# Patient Record
Sex: MALE | Race: WHITE | Employment: FULL TIME | URBAN - METROPOLITAN AREA
[De-identification: names, ages, dates, MRNs, and addresses within clinical notes are randomized per-mention and may not be internally consistent; named-entity substitution may affect disease eponyms.]

---

## 2017-10-26 ENCOUNTER — HOSPITAL ENCOUNTER (EMERGENCY)
Age: 32
Discharge: HOME | End: 2017-10-26
Attending: EMERGENCY MEDICINE | Admitting: EMERGENCY MEDICINE
Payer: MEDICARE

## 2017-10-26 VITALS
WEIGHT: 250 LBS | BODY MASS INDEX: 33.86 KG/M2 | HEIGHT: 72 IN | TEMPERATURE: 97.8 F | DIASTOLIC BLOOD PRESSURE: 105 MMHG | SYSTOLIC BLOOD PRESSURE: 165 MMHG | OXYGEN SATURATION: 100 % | HEART RATE: 78 BPM | RESPIRATION RATE: 16 BRPM

## 2017-10-26 DIAGNOSIS — R22.0 FACIAL SWELLING: ICD-10-CM

## 2017-10-26 DIAGNOSIS — L40.9 PSORIASIS: ICD-10-CM

## 2017-10-26 DIAGNOSIS — L23.89 ALLERGIC CONTACT DERMATITIS DUE TO OTHER AGENTS: Primary | ICD-10-CM

## 2017-10-26 PROCEDURE — A9270 NON-COVERED ITEM OR SERVICE: HCPCS | Performed by: EMERGENCY MEDICINE

## 2017-10-26 PROCEDURE — 74011636637 HC RX REV CODE- 636/637: Performed by: EMERGENCY MEDICINE

## 2017-10-26 PROCEDURE — 99283 EMERGENCY DEPT VISIT LOW MDM: CPT

## 2017-10-26 PROCEDURE — 74011250637 HC RX REV CODE- 250/637: Performed by: EMERGENCY MEDICINE

## 2017-10-26 RX ORDER — FAMOTIDINE 20 MG/1
20 TABLET, FILM COATED ORAL
Status: COMPLETED | OUTPATIENT
Start: 2017-10-26 | End: 2017-10-26

## 2017-10-26 RX ORDER — DIPHENHYDRAMINE HCL 25 MG
25 CAPSULE ORAL
Status: COMPLETED | OUTPATIENT
Start: 2017-10-26 | End: 2017-10-26

## 2017-10-26 RX ORDER — PREDNISONE 20 MG/1
60 TABLET ORAL DAILY
Qty: 15 TAB | Refills: 0 | Status: SHIPPED | OUTPATIENT
Start: 2017-10-26 | End: 2019-01-02 | Stop reason: ALTCHOICE

## 2017-10-26 RX ORDER — DIPHENHYDRAMINE HCL 25 MG
25 CAPSULE ORAL
Qty: 30 CAP | Refills: 0 | Status: SHIPPED | OUTPATIENT
Start: 2017-10-26

## 2017-10-26 RX ORDER — PREDNISONE 20 MG/1
60 TABLET ORAL
Status: COMPLETED | OUTPATIENT
Start: 2017-10-26 | End: 2017-10-26

## 2017-10-26 RX ORDER — FAMOTIDINE 20 MG/1
20 TABLET, FILM COATED ORAL
Qty: 6 TAB | Refills: 0 | Status: SHIPPED | OUTPATIENT
Start: 2017-10-26 | End: 2017-10-26

## 2017-10-26 RX ADMIN — PREDNISONE 60 MG: 20 TABLET ORAL at 22:42

## 2017-10-26 RX ADMIN — DIPHENHYDRAMINE HYDROCHLORIDE 25 MG: 25 CAPSULE ORAL at 22:42

## 2017-10-26 RX ADMIN — FAMOTIDINE 20 MG: 20 TABLET, FILM COATED ORAL at 22:42

## 2017-10-27 NOTE — ED PROVIDER NOTES
[facility] [address]  EMERGENCY DEPARTMENT HISTORY AND PHYSICAL EXAM       Date of Service: 10/26/2017   Patient Name: Rogelio Parker. YOB: 1985  Medical Record Number: [id number]    History of Presenting Illness     Chief Complaint   Patient presents with    Facial Swelling     Mostly to orbitals x 1 week. Pt has treated at home with Benadryl. Pt denies difficulty breathing, speaking or swallowing. History Provided By:  patient    Additional History:   Rogelio Gutierrez is a 27 y.o. male with PMhx significant for psoriasis and DM who presents ambulatory with his wife to the ED with cc of facial redness, swelling, and itchiness which began 1 week ago and has progressively worsened. Pt states that the area of irritation began under his left eye and spread to his forehead and right eye. Pt attempted to treat his symptoms with Benadryl and Ibuprofen with some relief. His swelling reduced but the itchiness remained. He stopped taking the medication a few days ago. Pt reports that he woke up this morning with his left eye \"looking like a black eye\" that progressively worsened throughout the day. Pt notes that he works in [employer] and does not use gloves. He does not see a dermatologist or allergist. He denies recent new medication, hair product use, new food intake, seafood intake, or other known causes of his symptoms. Pt specifically denies throat swelling or throat tightness. Social Hx: + (1ppd) Tobacco, - EtOH, - Illicit Drugs    There are no other complaints, changes or physical findings at this time.     Primary Care Provider: None     Past History     Past Medical History:   Past Medical History:   Diagnosis Date    Contact dermatitis and other eczema, due to unspecified cause     psoriasis    Diabetes ([facility] [address]     Ill-defined condition     psoriasis    Joint pain     Other ill-defined conditions(799.89)     psoriasis      Past Surgical History: History reviewed. No pertinent surgical history. Family History:   Family History   Problem Relation Age of Onset    Cancer Paternal Grandmother      breast cancer    Stroke Paternal Grandmother       Social History:   Social History   Substance Use Topics    Smoking status: Current Every Day Smoker     Packs/day: 1.00    Smokeless tobacco: Never Used    Alcohol use No      Allergies: Allergies   Allergen Reactions    Latex Rash and Swelling       Review of Systems   Review of Systems   Constitutional: Negative for chills and fever. HENT: Negative. Negative for congestion, rhinorrhea, sneezing and sore throat. - throat swelling  - throat tightness   Eyes: Negative. Negative for redness and visual disturbance. Respiratory: Negative. Negative for cough, shortness of breath and wheezing. Cardiovascular: Negative. Negative for chest pain and leg swelling. Gastrointestinal: Negative. Negative for abdominal pain, diarrhea, nausea and vomiting. Genitourinary: Negative. Negative for difficulty urinating, discharge and frequency. Musculoskeletal: Negative. Negative for arthralgias, back pain, myalgias and neck stiffness. Skin: Positive for rash (on face, itchy, red). Negative for color change. + swelling of face   Neurological: Negative. Negative for dizziness, syncope, weakness, numbness and headaches. Hematological: Negative for adenopathy. Psychiatric/Behavioral: Negative. All other systems reviewed and are negative. Physical Exam  Physical Exam   Constitutional: He is oriented to person, place, and time. HENT:   Head: Normocephalic and atraumatic. Diffuse facial swelling and bilateral periorbital edema, left greater than right, diffuse erythema over eyes and forehead  No excoriation or weeping  No glottal or sub glottal swelling   Eyes: EOM are normal.   Cardiovascular: Normal rate, regular rhythm, normal heart sounds and intact distal pulses.   Exam reveals no gallop and no friction rub. No murmur heard. Pulmonary/Chest: Effort normal and breath sounds normal. No accessory muscle usage. No respiratory distress. He has no wheezes. He has no rales. He exhibits no tenderness. No increased work of breathing  Speaking in full sentences   Abdominal: Soft. Bowel sounds are normal. He exhibits no distension and no mass. There is no tenderness. There is no rebound and no guarding. Musculoskeletal: Normal range of motion. He exhibits no edema or tenderness. Neurological: He is alert and oriented to person, place, and time. Skin:   Diffuse plaques (consistent with psoriasis) on upper extremities    Psychiatric: He has a normal mood and affect. Nursing note and vitals reviewed. Medical Decision Making   I am the first provider for this patient. I reviewed the vital signs, available nursing notes, past medical history, past surgical history, family history and social history. Old Medical Records: Nursing notes. Provider Notes: Allergic reaction versus contact dermatitis, most likely due to chemical exposure from [address] work, though cause currently unknown. Will treat symptomatically and refer to dermatologist for further evaluation. ED Course:  10:10 PM   Initial assessment performed. The patients presenting problems have been discussed, and they are in agreement with the care plan formulated and outlined with them. I have encouraged them to ask questions as they arise throughout their visit. Diagnostic Study Results     Vital Signs-Reviewed the patient's vital signs.    Patient Vitals for the past 12 hrs:   Temp Pulse Resp BP SpO2   10/26/17 2145 97.8 °F (36.6 °C) 78 16 (!) 165/105 100 %     Medications Given in the ED:  Medications   famotidine (PEPCID) tablet 20 mg (20 mg Oral Given 10/26/17 2242)   predniSONE (DELTASONE) tablet 60 mg (60 mg Oral Given 10/26/17 2242)   diphenhydrAMINE (BENADRYL) capsule 25 mg (25 mg Oral Given 10/26/17 8242)     Diagnosis   Clinical Impression:   1. Allergic contact dermatitis due to other agents    2. Facial swelling    3. Psoriasis       Plan:  1:   Follow-up Information     Follow up With Details Comments Contact Info    [facility] In 4 weeks  [address] [facility] EMERGENCY DEPT  As needed, If symptoms worsen [address] [place] [address]  [phone]        2:   Discharge Medication List as of 10/26/2017 10:27 PM      START taking these medications    Details   famotidine (PEPCID) 20 mg tablet Take 1 Tab by mouth now for 1 dose., Normal, Disp-6 Tab, R-0      predniSONE (DELTASONE) 20 mg tablet Take 3 Tabs by mouth daily. , Normal, Disp-15 Tab, R-0      diphenhydrAMINE (BENADRYL) 25 mg capsule Take 1 Cap by mouth every six (6) hours as needed., Normal, Disp-30 Cap, R-0           Return to ED if Worse    Disposition Note:  Discharge Note:  10:40 PM  The patient has been re-evaluated and is ready for discharge. Reviewed available results with patient. Counseled patient on diagnosis and care plan. Patient has expressed understanding, and all questions have been answered. Patient agrees with plan and agrees to follow up as recommended, or return to the ED if their symptoms worsen. Discharge instructions have been provided and explained to the patient, along with reasons to return to the ED.  _______________________________   Attestations:     Attestation: This note is prepared by Manuela Mcduffie, acting as Scribe for MD Nader Cardozo MD: The scribe's documentation has been prepared under my direction and personally reviewed by me in its entirety.  I confirm that the note above accurately reflects all work, treatment, procedures, and medical decision making performed by me.  _______________________________

## 2017-10-27 NOTE — ED TRIAGE NOTES
Woke up about a week ago and his eyes itched. Took benadryl without any effect. Now they are swollen.

## 2017-10-27 NOTE — ED NOTES
Dr. Samson Molina reviewed discharge instructions with the patient and spouse. The patient and spouse verbalized understanding.

## 2017-10-27 NOTE — DISCHARGE INSTRUCTIONS
Dermatitis: Care Instructions  Your Care Instructions  Dermatitis is the general name used for any rash or inflammation of the skin. Different kinds of dermatitis cause different kinds of rashes. Common causes of a rash include new medicines, plants (such as poison oak or poison ivy), heat, and stress. Certain illnesses can also cause a rash. An allergic reaction to something that touches your skin, such as latex, nickel, or poison ivy, is called contact dermatitis. Contact dermatitis may also be caused by something that irritates the skin, such as bleach, a chemical, or soap. These types of rashes cannot be spread from person to person. How long your rash will last depends on what caused it. Rashes may last a few days or months. Follow-up care is a key part of your treatment and safety. Be sure to make and go to all appointments, and call your doctor if you are having problems. It's also a good idea to know your test results and keep a list of the medicines you take. How can you care for yourself at home? · Do not scratch the rash. Cut your nails short, and file them smooth. Or wear gloves if this helps keep you from scratching. · Wash the area with water only. Pat dry. · Put cold, wet cloths on the rash to reduce itching. · Keep cool, and stay out of the sun. · Leave the rash open to the air as much as possible. · If the rash itches, use hydrocortisone cream. Follow the directions on the label. Calamine lotion may help for plant rashes. · Take an over-the-counter antihistamine, such as diphenhydramine (Benadryl) or loratadine (Claritin), to help calm the itching. Read and follow all instructions on the label. · If your doctor prescribed a cream, use it as directed. If your doctor prescribed medicine, take it exactly as directed. When should you call for help?   Call your doctor now or seek immediate medical care if:  ? · You have symptoms of infection, such as:  ¨ Increased pain, swelling, warmth, or redness. ¨ Red streaks leading from the area. ¨ Pus draining from the area. ¨ A fever. ? · You have joint pain along with the rash. ? Watch closely for changes in your health, and be sure to contact your doctor if:  ? · Your rash is changing or getting worse. ? · You are not getting better as expected. Where can you learn more? Go to http://nae-barbara.info/. Enter [id number] in the search box to learn more about \"Dermatitis: Care Instructions. \"  Current as of: October 13, 2016  Content Version: 11.4  © 0827-3061 [employer]. Care instructions adapted under license by [employer] (which disclaims liability or warranty for this information). If you have questions about a medical condition or this instruction, always ask your healthcare professional. [address] any warranty or liability for your use of this information. Psoriasis: Care Instructions  Your Care Instructions  Psoriasis (say \"mai-EG-ht-juan carlos\") is a long-term skin problem that causes thick, white, silvery, or red patches on the skin. The patches may be small or large, and they occur most often on the knees, elbows, scalp, hands, feet, or lower back. The skin may be scaly. If the condition is severe, your skin can become itchy and tender. Psoriasis also can be embarrassing if the patches are on visible areas. You can treat psoriasis with good care at home and with medicine from your doctor. You may put medicine on your skin and take pills or have shots to stop the redness and swelling. Your doctor also may suggest ultraviolet light treatments. Follow-up care is a key part of your treatment and safety. Be sure to make and go to all appointments, and call your doctor if you are having problems. It's also a good idea to know your test results and keep a list of the medicines you take. How can you care for yourself at home?   · If your doctor prescribes medicine, use it exactly as prescribed. Follow your doctor's advice for sunlight or ultraviolet light treatment. Call your doctor if you think you are having a problem with your medicine. · Protect your skin:  ¨ Keep your skin moist. After bathing, put an ointment, cream, or lotion on your skin while it is still damp. This seals in moisture. Use over-the-counter products that your doctor suggests. These may include Cetaphil, Lubriderm, or Eucerin. Petroleum jelly (such as Vaseline) and vegetable shortening (such as Crisco) also work. ¨ If you have psoriasis on your scalp, use a shampoo with salicylic acid, such as Neutrogena T/Sal.  ¨ Avoid harsh skin products, such as those that contain alcohol. ¨ Cover your skin in cold weather. ¨ Try to prevent sunburn. Although short periods of sun exposure reduce psoriasis in most people, too much sun can damage the skin and cause skin cancer. In addition, sunburns can trigger psoriasis. Use sunscreen on areas of your skin that do not have psoriasis. Make sure to use a broad-spectrum sunscreen that has a sun protection factor (SPF) of 30 or higher. Use it every day, even when it is cloudy. ¨ Take care to avoid accidents such as cutting or scraping your skin. An injury to the skin can cause psoriasis patches to form anywhere on the body, including the area of the injury. ¨ Avoid tight shoes, clothing, watchbands, and hats. These may irritate your skin. ¨ Use a vaporizer or humidifier to add moisture to your bedroom. Follow the directions for cleaning the machine. · Try making one or more changes to your daily habits to help with managing your psoriasis. For example:  ¨ Try to control stress and anxiety. They may cause psoriasis to appear suddenly or can make symptoms worse. ¨ If you smoke, think about quitting. If you need help quitting, talk to your doctor about stop-smoking programs and medicines. ¨ If you drink, limit or reduce the amount of alcohol you drink.   ¨ If you are overweight, see if you can lose some weight. · Seek support from family and friends. Talk to a counselor or other professional if you feel sad about your condition and need more help. When should you call for help? Call your doctor now or seek immediate medical care if:  ? · You have signs of infection, such as:  ¨ Increased pain, swelling, warmth, or redness. ¨ Red streaks leading from the area. ¨ Pus draining from the area. ¨ A fever. ? Watch closely for changes in your health, and be sure to contact your doctor if:  ? · You have swelling, stiffness, or pain in your joints. ? · You do not get better as expected. Where can you learn more? Go to http://nae-barbara.info/. Enter Z693 in the search box to learn more about \"Psoriasis: Care Instructions. \"  Current as of: October 13, 2016  Content Version: 11.4  © 9822-9853 [employer]. Care instructions adapted under license by [employer] (which disclaims liability or warranty for this information). If you have questions about a medical condition or this instruction, always ask your healthcare professional. [place] [address] any warranty or liability for your use of this information.

## 2019-01-02 ENCOUNTER — OFFICE VISIT (OUTPATIENT)
Dept: PRIMARY CARE CLINIC | Age: 34
End: 2019-01-02

## 2019-01-02 VITALS
DIASTOLIC BLOOD PRESSURE: 86 MMHG | TEMPERATURE: 98.3 F | RESPIRATION RATE: 18 BRPM | WEIGHT: 260.6 LBS | OXYGEN SATURATION: 96 % | HEIGHT: 74 IN | BODY MASS INDEX: 33.45 KG/M2 | SYSTOLIC BLOOD PRESSURE: 138 MMHG | HEART RATE: 89 BPM

## 2019-01-02 DIAGNOSIS — L40.9 PSORIASIS: ICD-10-CM

## 2019-01-02 DIAGNOSIS — H61.21 IMPACTED CERUMEN OF RIGHT EAR: ICD-10-CM

## 2019-01-02 DIAGNOSIS — H92.01 RIGHT EAR PAIN: ICD-10-CM

## 2019-01-02 DIAGNOSIS — L40.50 ARTHRITIS WITH PSORIASIS (HCC): ICD-10-CM

## 2019-01-02 DIAGNOSIS — F31.9 BIPOLAR DISORDER WITH DEPRESSION (HCC): ICD-10-CM

## 2019-01-02 DIAGNOSIS — Z72.0 TOBACCO ABUSE: ICD-10-CM

## 2019-01-02 DIAGNOSIS — E11.9 CONTROLLED TYPE 2 DIABETES MELLITUS WITHOUT COMPLICATION, WITHOUT LONG-TERM CURRENT USE OF INSULIN (HCC): Primary | ICD-10-CM

## 2019-01-02 DIAGNOSIS — D49.2 ABNORMAL SKIN GROWTH: ICD-10-CM

## 2019-01-02 DIAGNOSIS — E66.9 OBESITY (BMI 30.0-34.9): ICD-10-CM

## 2019-01-02 NOTE — PROGRESS NOTES
Chief Complaint   Patient presents with   Bebeto.Butt Establish Care    Ear Pain     right      Visit Vitals  BP (!) 155/91 (BP 1 Location: Right arm, BP Patient Position: Sitting)   Pulse 89   Temp 98.3 °F (36.8 °C) (Oral)   Resp 18   Ht 6' 1.83\" (1.875 m)   Wt 260 lb 9.6 oz (118.2 kg)   SpO2 96%   BMI 33.61 kg/m²     1. Have you been to the ER, urgent care clinic since your last visit? Hospitalized since your last visit? No    2. Have you seen or consulted any other health care providers outside of the [address] [place] [address] since your last visit? Include any pap smears or colon screening.  No

## 2019-01-02 NOTE — PROGRESS NOTES
Written by Jeanna Merlin, as dictated by Dr. Deni Stanton MD.    Solitario Carlos. is a 26 y.o. male. HPI  The patient comes in today to establish care. He notes that it has been a while since he was last seen by a physician. His last PCP wanted him to check his BS at home . Patient was never prescribed medication for diabetes. Sometimes he does not have time to eat lunch during the day as he is busy, but if he does eat lunch it is a sandwich and soda. He denies weight gain and weight loss. He is not fasting for labs. In 2012 he went to a mental health clinic and he was admitted to the psychiatric [facility] for about 1 week for depression. He was diagnosed with bipolar disorder and depression, and was prescribed medication. The patient does not recall the name of the medication, but know it was not Zoloft. He took Zoloft when he was younger, but it caused aggression. Patient has not taken lithium, but notes that Lexapro sounds familiar. In 07/2018 he got a new job, and notes that going to work helps to keep him busy and feel better. However, there are some days when he still feels down. Patient notes that he is not a social person and he is ready to go home after visiting family for about 1 hour. His girlfriend has noticed that he is sometimes in \"his own little bubble\" and he snaps if she \"pops it. \" The patient has not realized that the snaps in those situations. He does not feel that he needs medication at this time. He uses OTC Eucerin for psoriasis, but notes he did not apply it today because it clears up his rash. Patient has been experiencing knee and hand pain, which he believes is due to psoriatic arthritis. The patient is only able to hold objects for a certain amount of time as it starts to cause pain. Patient has been experiencing R ear pain x about 1 month. He woke up one morning with the pain and lost hearing in his R ear.  Patient tried to clean his ear with q-tips without success. He has used OTC ear cleaning solution, peroxide, and ETOH without success. The patient stuck his finger in his ear and could feel something. Some days he cannot hear anything and other days sounds are muffled. The patient notes that he sometimes has migraines. He notes that some days his migraines present in the middle of the day. Taking OTC Excedrin and and laying down provides relief. The patient has a mole on his face, which he has had since he was a child. Patient notes that the mole was much smaller when he was a child but has grown in size lately and would like to have it examined. He has not had the mole examined in the past as he did not have insurance. BP is high today at 155/91, 138/86 on repeat. He has not been checking his BP at home. Patient smokes 2 pack of cigarettes daily. He notes that some days he only smokes 1 pack of cigarettes. The patient notes that smoking helps with his anxiety. He does not drink ETOH or use recreational drugs. 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 Patient Active Problem List   Diagnosis Code    DM (diabetes mellitus) ([facility] E11.9    Psoriasis L40.9    [facility], [facility] ([facility] F31.9        Current Outpatient Medications on File Prior to Visit   Medication Sig Dispense Refill    diphenhydrAMINE (BENADRYL) 25 mg capsule Take 1 Cap by mouth every six (6) hours as needed. 30 Cap 0     No current facility-administered medications on file prior to visit.         Allergies   Allergen Reactions    Latex Rash and Swelling       Past Medical History:   Diagnosis Date    Bipolar 1 disorder ([facility]     Contact dermatitis and other eczema, due to unspecified cause     psoriasis    Diabetes ([facility]     Ill-defined condition     psoriasis    Joint pain     Other ill-defined conditions(799.89)     psoriasis    Psoriasis        Family History   Problem Relation Age of Onset    Seizures Mother     Diabetes Father     Cancer Paternal Grandmother         breast cancer    Stroke Paternal Grandmother        Social History     Socioeconomic History    Marital status: UNKNOWN     Spouse name: Not on file    Number of children: Not on file    Years of education: Not on file    Highest education level: Not on file   Social Needs    Financial resource strain: Not on file    Food insecurity - worry: Not on file    Food insecurity - inability: Not on file   [employer] needs - medical: Not on file   [employer] needs - non-medical: Not on file   Occupational History    Not on file   Tobacco Use    Smoking status: Current Every Day Smoker     Packs/day: 2.00    Smokeless tobacco: Never Used   Substance and Sexual Activity    Alcohol use: No     Comment: occasional    Drug use: Not on file    Sexual activity: Yes     Partners: Female   Other Topics Concern    Not on file   Social History Narrative    Not on file       Review of Systems   Constitutional: Negative for malaise/fatigue and weight loss. HENT: Positive for ear pain (R). Negative for congestion. Eyes: Negative for blurred vision and pain. Respiratory: Negative for cough and shortness of breath. Cardiovascular: Negative for chest pain and palpitations. Gastrointestinal: Negative for abdominal pain and heartburn. Genitourinary: Negative for frequency and urgency. Musculoskeletal: Positive for joint pain (BL knee and hand). Negative for myalgias. Skin: Positive for rash. +mole on face   Neurological: Positive for headaches. Negative for dizziness, tingling, sensory change and weakness. Psychiatric/Behavioral: Positive for depression and substance abuse (tobacco). Negative for memory loss. The patient is nervous/anxious.       Visit Vitals  BP (!) 155/91 (BP 1 Location: Right arm, BP Patient Position: Sitting)   Pulse 89   Temp 98.3 °F (36.8 °C) (Oral)   Resp 18   Ht 6' 1.83\" (1.875 m)   Wt 260 lb 9.6 oz (118.2 kg)   SpO2 96%   BMI 33.61 kg/m²       Physical Exam   Constitutional: He is oriented to person, place, and time. He appears well-developed. No distress. Obese   HENT:   Right Ear: External ear normal.   Left Ear: External ear normal.   R cerumen impaction   Eyes: Conjunctivae and EOM are normal. Right eye exhibits no discharge. Left eye exhibits no discharge. Neck: Normal range of motion. Neck supple. Cardiovascular: Normal rate and regular rhythm. Pulmonary/Chest: Effort normal and breath sounds normal. He has no wheezes. Abdominal: Soft. Bowel sounds are normal. There is no tenderness. Lymphadenopathy:     He has no cervical adenopathy. Neurological: He is alert and oriented to person, place, and time. Skin: He is not diaphoretic. No erythema. Large skin growth on L temple  + scaly rash on both forearms & dorsum of the hand   Psychiatric: He has a normal mood and affect. His behavior is normal.   Nursing note and vitals reviewed. ASSESSMENT and PLAN    ICD-10-CM ICD-9-CM    1. Controlled type 2 diabetes mellitus without complication, without long-term current use of insulin (HCC) E11.9 250.00 CBC WITH AUTOMATED DIFF      METABOLIC PANEL, COMPREHENSIVE      HEMOGLOBIN A1C WITH EAG    CBC, CMP, and HbA1c ordered. I will contact him with his lab results. 2. Psoriasis L40.9 696.1 Patient uses OTC Eucerin with relief. Referred to dermatology. 3. Arthritis with psoriasis ([facility] L40.50 696.0 No treatment given at this time. 4. Bipolar disorder with depression ([facility] F31.30 296.50 No medication given at this time. He should let me know if he starts feeling more depression and I will prescribe medication. 5. Obesity (BMI 30.0-34. 9) E66.9 278.00 Encouraged him to maintain a healthy diet and exercise. 6. Right ear pain H92.01 388.70 Ear wash performed in office.    7. Impacted cerumen of right ear H61.21 380.4 REMOVE IMPACTED EAR WAX    Time out: Immediately prior to procedure a \"time out\" was called to verify the correct patient, procedure, equipment, support staff and site/side marked as required. Ear wash attempted in office. Patient could not tolerate the ear wash. He should try OTC treatment to clean his ear, but he should not use q-tips. If patient is unable to dislodge the cerumen, I will refer him to ENT. 8. Tobacco abuse Z72.0 305.1 Urged him to quit smoking. He should slowly decrease from 2 packs per days to 1.5 or 1 pack daily. 9. Abnormal skin growth D49.2 239.2 REFERRAL TO DERMATOLOGY    Referred to dermatology. Patient's next appointment should be a complete physical exam and he should fast for labs. This plan was reviewed with the patient and patient agrees. All questions were answered. This scribe documentation was reviewed by me and accurately reflects the examination and decisions made by me. This note will not be viewable in [address]

## 2019-01-03 LAB
ALBUMIN SERPL-MCNC: 4.3 G/DL (ref 3.5–5.5)
ALBUMIN/GLOB SERPL: 1.6 {RATIO} (ref 1.2–2.2)
ALP SERPL-CCNC: 80 IU/L (ref 39–117)
ALT SERPL-CCNC: 60 IU/L (ref 0–44)
AST SERPL-CCNC: 32 IU/L (ref 0–40)
BASOPHILS # BLD AUTO: 0 X10E3/UL (ref 0–0.2)
BASOPHILS NFR BLD AUTO: 0 %
BILIRUB SERPL-MCNC: 0.5 MG/DL (ref 0–1.2)
BUN SERPL-MCNC: 9 MG/DL (ref 6–20)
BUN/CREAT SERPL: 9 (ref 9–20)
CALCIUM SERPL-MCNC: 9.4 MG/DL (ref 8.7–10.2)
CHLORIDE SERPL-SCNC: 104 MMOL/L (ref 96–106)
CO2 SERPL-SCNC: 22 MMOL/L (ref 20–29)
CREAT SERPL-MCNC: 0.96 MG/DL (ref 0.76–1.27)
EOSINOPHIL # BLD AUTO: 0.2 X10E3/UL (ref 0–0.4)
EOSINOPHIL NFR BLD AUTO: 2 %
ERYTHROCYTE [DISTWIDTH] IN BLOOD BY AUTOMATED COUNT: 13.4 % (ref 12.3–15.4)
EST. AVERAGE GLUCOSE BLD GHB EST-MCNC: 111 MG/DL
GLOBULIN SER CALC-MCNC: 2.7 G/DL (ref 1.5–4.5)
GLUCOSE SERPL-MCNC: 146 MG/DL (ref 65–99)
HBA1C MFR BLD: 5.5 % (ref 4.8–5.6)
HCT VFR BLD AUTO: 45.6 % (ref 37.5–51)
HGB BLD-MCNC: 15.7 G/DL (ref 13–17.7)
IMM GRANULOCYTES # BLD: 0 X10E3/UL (ref 0–0.1)
IMM GRANULOCYTES NFR BLD: 0 %
LYMPHOCYTES # BLD AUTO: 1.8 X10E3/UL (ref 0.7–3.1)
LYMPHOCYTES NFR BLD AUTO: 19 %
MCH RBC QN AUTO: 30.4 PG (ref 26.6–33)
MCHC RBC AUTO-ENTMCNC: 34.4 G/DL (ref 31.5–35.7)
MCV RBC AUTO: 88 FL (ref 79–97)
MONOCYTES # BLD AUTO: 0.6 X10E3/UL (ref 0.1–0.9)
MONOCYTES NFR BLD AUTO: 7 %
NEUTROPHILS # BLD AUTO: 6.6 X10E3/UL (ref 1.4–7)
NEUTROPHILS NFR BLD AUTO: 72 %
PLATELET # BLD AUTO: 183 X10E3/UL (ref 150–379)
POTASSIUM SERPL-SCNC: 3.8 MMOL/L (ref 3.5–5.2)
PROT SERPL-MCNC: 7 G/DL (ref 6–8.5)
RBC # BLD AUTO: 5.17 X10E6/UL (ref 4.14–5.8)
SODIUM SERPL-SCNC: 143 MMOL/L (ref 134–144)
WBC # BLD AUTO: 9.2 X10E3/UL (ref 3.4–10.8)

## 2019-01-07 NOTE — PROGRESS NOTES
Let him know diabetes number came back fine but liver enzymes are elevated. Ask him if his ear is feeling better otherwise he can come for ear wash.

## 2019-01-14 ENCOUNTER — TELEPHONE (OUTPATIENT)
Dept: PRIMARY CARE CLINIC | Age: 34
End: 2019-01-14

## 2019-01-14 NOTE — PROGRESS NOTES
Spoke to patient reviewed labs. He states he feels if he still has something in his ear. He has been using softening drops and will come for another ear wash this week.

## 2019-01-16 ENCOUNTER — CLINICAL SUPPORT (OUTPATIENT)
Dept: PRIMARY CARE CLINIC | Age: 34
End: 2019-01-16

## 2019-01-16 ENCOUNTER — OFFICE VISIT (OUTPATIENT)
Dept: PRIMARY CARE CLINIC | Age: 34
End: 2019-01-16

## 2019-01-16 VITALS
DIASTOLIC BLOOD PRESSURE: 94 MMHG | OXYGEN SATURATION: 97 % | SYSTOLIC BLOOD PRESSURE: 137 MMHG | HEART RATE: 84 BPM | HEIGHT: 73 IN | WEIGHT: 260 LBS | RESPIRATION RATE: 16 BRPM | TEMPERATURE: 98.4 F | BODY MASS INDEX: 34.46 KG/M2

## 2019-01-16 DIAGNOSIS — H92.01 RIGHT EAR PAIN: Primary | ICD-10-CM

## 2019-01-16 DIAGNOSIS — H61.21 IMPACTED CERUMEN OF RIGHT EAR: ICD-10-CM

## 2019-01-16 DIAGNOSIS — T16.1XXA FOREIGN BODY OF RIGHT EAR, INITIAL ENCOUNTER: Primary | ICD-10-CM

## 2019-01-16 DIAGNOSIS — H92.01 EAR PAIN, RIGHT: ICD-10-CM

## 2019-01-16 DIAGNOSIS — H91.91 DECREASED HEARING OF RIGHT EAR: ICD-10-CM

## 2019-01-16 NOTE — PROGRESS NOTES
This note will not be viewable in [address] Prateek Person is a  26 y.o. male presents for visit. Right ear pain and decreased hearing. Chief Complaint   Patient presents with    Ear Pain     patient came in for ear cleaning. states that right ear is having some pain and was using drops to soften and would like the ear cleaned. HPI  Patient presents for follow-up on right ear pain with decreased hearing/cerumen impaction. He was seen by Dr. Violeta Ngo a couple of weeks ago and an attempt to lavage his right ear was unsuccessful. Since that time he has been using Debrox and hydrogen peroxide drops without success. He is continuing to complain of decreased hearing with pain noted. Patient reports using ear buds at night. This is mainly in his right ear due to psoriasis scales in his left ear. No additional complaints today. Review of Systems   Constitutional: Negative for chills and fever. HENT: Positive for ear pain and hearing loss. Negative for ear discharge and sinus pain. Respiratory: Negative for cough. Cardiovascular: Negative for chest pain and palpitations. Visit Vitals  BP (!) 137/94 (BP 1 Location: Right arm, BP Patient Position: Sitting)   Pulse 84   Temp 98.4 °F (36.9 °C) (Oral)   Resp 16   Ht 6' 1\" (1.854 m)   Wt 260 lb (117.9 kg)   SpO2 97%   BMI 34.30 kg/m²     Physical Exam   Constitutional: No distress. HENT:   Head: Normocephalic and atraumatic. Right Ear: Tympanic membrane is not erythematous. No middle ear effusion. Left Ear: Tympanic membrane is not erythematous. No middle ear effusion. Nose: No mucosal edema or rhinorrhea. Right sinus exhibits no maxillary sinus tenderness and no frontal sinus tenderness. Left sinus exhibits no maxillary sinus tenderness and no frontal sinus tenderness. Mouth/Throat: Uvula is midline and mucous membranes are normal. No oropharyngeal exudate or posterior oropharyngeal erythema.    Foreign body noted to right ear canal after lavage. Appeared to be a black plastic With demarcated edges. Able to visualize his TM around the foreign body and appeared normal.   Eyes: Conjunctivae are normal.   Cardiovascular: Regular rhythm and normal heart sounds. No murmur heard. Pulmonary/Chest: Effort normal and breath sounds normal. He has no wheezes. He has no rales. Lymphadenopathy:     He has no cervical adenopathy. Skin:   Diffuse scaly plaques noted to skin. Nursing note and vitals reviewed. Time out: Immediately prior to procedure a \"time out\" was called to verify the correct patient, procedure, equipment, support staff and site/side marked as required. No results found for this or any previous visit (from the past 24 hour(s)). Patient Active Problem List    Diagnosis Date Noted    DM (diabetes mellitus) ([facility] 10/31/2011    Psoriasis 10/31/2011    Bipolar disease, chronic ([facility] 10/31/2011         ASSESSMENT AND PLAN:      ICD-10-CM ICD-9-CM   1. Foreign body of right ear, initial encounter T16. 1XXA [id number]     [id number]   2. Ear pain, right H92.01 388.70   3. Decreased hearing of right ear H91.91 389.9   4. Impacted cerumen of right ear H61.21 380.4     Orders Placed This Encounter    REFERRAL TO ENT-OTOLARYNGOLOGY     Referral Priority:   Routine     Referral Type:   Consultation     Referral Reason:   Specialty Services Required     Requested Specialty:   Otolaryngology     Number of Visits Requested:   1    MA REMOVAL IMPACTED CERUMEN IRRIGATION/LVG UNILAT     Diagnoses and all orders for this visit:    1. Foreign body of right ear, initial encounter  -     REFERRAL TO ENT-OTOLARYNGOLOGY    2. Ear pain, right  -     REFERRAL TO ENT-OTOLARYNGOLOGY    3. Decreased hearing of right ear  -     REFERRAL TO ENT-OTOLARYNGOLOGY    4.  Impacted cerumen of right ear  -     MA REMOVAL IMPACTED CERUMEN IRRIGATION/LVG UNILAT        Telephone call made to Dr. Tejal Espinosa office by Shazia Howard LPN to schedule appointment immediately. Patient agreed to drive over to the office on his own. Follow-up Disposition:  Return in about 2 weeks (around 1/30/2019), or if symptoms worsen or fail to improve, for FULL Phyisal Exam with Dr. Violeta Ngo. Dr. Violeta Ngo personally evaluated patient and agreed with plan to refer patient to ENT for further treatment. Disclaimer:  Advised him to call back or return to office if symptoms worsen/change/persist.  Discussed expected course/resolution/complications of diagnosis in detail with patient. Medication risks/benefits/alternatives discussed with patient. He was given an after visit summary which includes diagnoses, current medications, & vitals. Discussed patient instructions and advised to read to all patient instructions regarding care. He expressed understanding with the diagnosis and plan.

## 2019-01-16 NOTE — PROGRESS NOTES
Chief Complaint   Patient presents with    Ear Pain     patient came in for ear cleaning. states that right ear is having some pain and was using drops to soften and would like the ear cleaned.

## 2019-02-25 ENCOUNTER — OFFICE VISIT (OUTPATIENT)
Dept: PRIMARY CARE CLINIC | Age: 34
End: 2019-02-25

## 2019-02-25 VITALS
OXYGEN SATURATION: 99 % | HEART RATE: 90 BPM | SYSTOLIC BLOOD PRESSURE: 135 MMHG | DIASTOLIC BLOOD PRESSURE: 90 MMHG | RESPIRATION RATE: 18 BRPM | WEIGHT: 264.4 LBS | HEIGHT: 73 IN | BODY MASS INDEX: 35.04 KG/M2 | TEMPERATURE: 100.6 F

## 2019-02-25 DIAGNOSIS — R68.89 FLU-LIKE SYMPTOMS: ICD-10-CM

## 2019-02-25 DIAGNOSIS — R50.9 LOW GRADE FEVER: ICD-10-CM

## 2019-02-25 DIAGNOSIS — J02.9 SORE THROAT: ICD-10-CM

## 2019-02-25 DIAGNOSIS — J02.0 STREP PHARYNGITIS: Primary | ICD-10-CM

## 2019-02-25 LAB
FLUAV+FLUBV AG NOSE QL IA.RAPID: NEGATIVE POS/NEG
FLUAV+FLUBV AG NOSE QL IA.RAPID: NEGATIVE POS/NEG
S PYO AG THROAT QL: POSITIVE
VALID INTERNAL CONTROL?: YES
VALID INTERNAL CONTROL?: YES

## 2019-02-25 RX ORDER — AMOXICILLIN 875 MG/1
875 TABLET, FILM COATED ORAL 2 TIMES DAILY
Qty: 20 TAB | Refills: 0 | Status: SHIPPED | OUTPATIENT
Start: 2019-02-25 | End: 2019-03-07

## 2019-02-25 NOTE — PROGRESS NOTES
HPI:     Chief Complaint   Patient presents with    Generalized Body Aches    Flu Like Symptoms     body ache fever two days        Patient is a 26 y.o. male who presents for evaluation of flu-like symptoms and sore throat. Patient reports 2 day history of sore throat, nasal congestion, body aches, dry nonproductive cough, and fever. Patient reports temp of 100.9 earlier today. Denies rhinorrhea, headache, ear pain, dyspnea, wheezing, difficulty breathing, chest pain, abdominal pain, nausea, vomiting, diarrhea. Appetite is decreased. Reports many people at work are sick with similar symptoms. Has been taking nyquil and tylenol, which help somewhat. Patient did not get flu shot this year. Patient Active Problem List   Diagnosis Code    [facility] E11.9    Psoriasis L40.9    Bipolar disease, chronic ([facility] F31.9     Current Outpatient Medications   Medication Sig Dispense Refill    amoxicillin (AMOXIL) 875 mg tablet Take 1 Tab by mouth two (2) times a day for 10 days. 20 Tab 0    diphenhydrAMINE (BENADRYL) 25 mg capsule Take 1 Cap by mouth every six (6) hours as needed. 30 Cap 0     Allergies   Allergen Reactions    Latex Rash and Swelling     Past Medical History:   Diagnosis Date    B[facility] disorder ([facility]     Contact dermatitis and other eczema, due to unspecified cause     psoriasis    Diabetes ([facility]     Ill-defined condition     psoriasis    Joint pain     Other ill-defined conditions(799.89)     psoriasis    Psoriasis           ROS:   Pertinent items are noted in HPI. Objective:     Vitals:    02/25/19 1548   BP: 135/90   Pulse: 90   Resp: 18   Temp: (!) 100.6 °F (38.1 °C)   TempSrc: Oral   SpO2: 99%   Weight: 264 lb 6.4 oz (119.9 kg)   Height: 6' 1\" (1.854 m)        Vitals and Nurse Documentation reviewed.     Physical Examination:   General appearance - alert, well appearing, and in no distress  Mental status - alert, oriented to person, place, and time, normal mood, behavior, speech, dress, motor activity, and thought processes  Eyes - pupils equal and reactive, sclera white, conjunctiva pink  Ears - bilateral TM's and external ear canals normal  Nose - mucosal congestion, mucosal erythema, purulent rhinorrhea, sinuses nontender  Mouth - mucous membranes moist, tonsils and pharynx erythematous, no exudate noted  Neck - supple, no significant adenopathy  Chest - clear to auscultation, no wheezes, rales or rhonchi, symmetric air entry  Heart - normal rate, regular rhythm, normal S1, S2, no murmurs, rubs, clicks or gallops  Neurological - alert, oriented, normal speech, no focal findings or movement disorder noted  Extremities - peripheral pulses normal, no pedal edema, no clubbing or cyanosis      Assessment/ Plan:   Diagnoses and all orders for this visit:    1. Strep pharyngitis  -     Rapid strep is positive. -     Start amoxicillin (AMOXIL) 875 mg tablet; Take 1 Tab by mouth two (2) times a day for 10 days. Medication benefits, risks, indication, dosage, potential adverse effects, and alternate medication options were discussed with patient who expressed understanding. Discussed that antibiotic can cause N/V/D and to increase fluid intake and consider probiotic or yogurt with probiotics. -     Acetaminophen or ibuprofen as needed for fever, discomfort   -     Advised on supportive care, throat lozenges, salt water gargles, soft/cold foods, increased hydration, cool mist humidifier  -     Discard toothbrush after 24 hours of treatment    2. Sore throat  -     AMB POC RAPID STREP A is positive. 3. Flu-like symptoms  -     AMB POC PJ INFLUENZA A/B TEST is negative. 4. Low grade fever  -     AMB POC PJ INFLUENZA A/B TEST  -     AMB POC RAPID STREP A       Follow-up Disposition:  Return if symptoms worsen or fail to improve. I have discussed the diagnosis with the patient and the intended plan as seen in the above orders.   Advised prompt follow-up if symptoms worsen or fail to improve and symptoms that would warrant emergent evaluation in ED. The patient has received an after-visit summary and questions were answered concerning future plans. I have discussed medication side effects and warnings with the patient as well. Patient expressed understanding and is in agreement with the diagnosis and plan.

## 2019-02-25 NOTE — LETTER
NOTIFICATION RETURN TO WORK / SCHOOL 
 
2/25/2019 4:22 PM 
 
Mr. Jackson Valenzuela. [address] [address] To Whom It May Concern: 
 
Jackson Valenzuela. is currently under the care of Mary Matias. Patient was evaluated in the office today 2/25/19. Please excuse from work 2/26/19. If there are questions or concerns please have the patient contact our office.  
 
 
 
Sincerely, 
 
 
Fiona Richardson NP

## 2019-02-25 NOTE — PATIENT INSTRUCTIONS

## 2019-03-20 ENCOUNTER — OFFICE VISIT (OUTPATIENT)
Dept: DERMATOLOGY | Facility: AMBULATORY SURGERY CENTER | Age: 34
End: 2019-03-20

## 2019-03-20 ENCOUNTER — HOSPITAL ENCOUNTER (OUTPATIENT)
Dept: LAB | Age: 34
Discharge: HOME | End: 2019-03-20

## 2019-03-20 VITALS
HEIGHT: 73 IN | WEIGHT: 264 LBS | TEMPERATURE: 98.2 F | RESPIRATION RATE: 16 BRPM | BODY MASS INDEX: 34.99 KG/M2 | DIASTOLIC BLOOD PRESSURE: 78 MMHG | HEART RATE: 82 BPM | SYSTOLIC BLOOD PRESSURE: 160 MMHG | OXYGEN SATURATION: 98 %

## 2019-03-20 DIAGNOSIS — D48.5 NEOPLASM OF UNCERTAIN BEHAVIOR OF SKIN OF TEMPORAL REGION: Primary | ICD-10-CM

## 2019-03-20 DIAGNOSIS — L40.9 PSORIASIS: ICD-10-CM

## 2019-03-20 DIAGNOSIS — D22.4 NEVUS OF SCALP: ICD-10-CM

## 2019-03-20 PROCEDURE — 88305 TISSUE EXAM BY PATHOLOGIST: CPT

## 2019-03-20 NOTE — PROGRESS NOTES
Written by Virgil Easley, as dictated by Yogi Bryant, [address]. Name: Angelica Jarvis. Age: 26 y.o. Date: 3/20/2019    Chief Complaint:   Chief Complaint   Patient presents with    Skin Exam     Areas of concern: Left temple - mole       Subjective:    HPI:  Mr.. Angelica Jarvis. is a 26 y.o. male who presents for the evaluation of a lesion on the left temporal hairline. The patient was referred by Yulisa Garcia NP for this evaluation. He states that the lesion appeared in his early childhood. The patient has not had prior treatment for this lesion. Associated symptoms include growing lesion. He states the lesion has been present since he can remember, however it has grown rapidly in the past few years. Gets hit with haircuts and brushing. No other associated symptoms. He also notes a similar smaller lesion on the scalp. He reports having psoriasis since a teen.     ROS: Consitutional: Negative  Dermatological : positive for - skin lesion changes    Social History     Socioeconomic History    Marital status: UNKNOWN     Spouse name: Not on file    Number of children: Not on file    Years of education: Not on file    Highest education level: Not on file   Occupational History    Not on file   Social Needs    Financial resource strain: Not on file    Food insecurity:     Worry: Not on file     Inability: Not on file    Transportation needs:     Medical: Not on file     Non-medical: Not on file   Tobacco Use    Smoking status: Current Every Day Smoker     Packs/day: 2.00    Smokeless tobacco: Never Used   Substance and Sexual Activity    Alcohol use: No     Comment: occasional    Drug use: Not on file    Sexual activity: Yes     Partners: Female   Lifestyle    Physical activity:     Days per week: Not on file     Minutes per session: Not on file    Stress: Not on file   Relationships    Social connections:     Talks on phone: Not on file     Gets together: Not on file     Attends [employer] service: Not on file     Active member of club or organization: Not on file     Attends meetings of clubs or organizations: Not on file     Relationship status: Not on file    Intimate partner violence:     Fear of current or ex partner: Not on file     Emotionally abused: Not on file     Physically abused: Not on file     Forced sexual activity: Not on file   Other Topics Concern    Not on file   Social History Narrative    Not on file       Family History   Problem Relation Age of Onset    Seizures Mother     Diabetes Father     Cancer Paternal Grandmother         breast cancer    Stroke Paternal Grandmother        Past Medical History:   Diagnosis Date    [facility] ([facility]     Contact dermatitis and other eczema, due to unspecified cause     psoriasis    Diabetes ([facility]     Ill-defined condition     psoriasis    Joint pain     Other ill-defined conditions(799.89)     psoriasis    Psoriasis     Sun-damaged skin     Tanning bed exposure        History reviewed. No pertinent surgical history. Current Outpatient Medications   Medication Sig Dispense Refill    ibuprofen 100 mg tablet Take 100 mg by mouth every six (6) hours as needed for Pain.  aspirin-acetaminophen-caffeine (EXCEDRIN ES) 250-250-65 mg per tablet Take 1 Tab by mouth.  diphenhydrAMINE (BENADRYL) 25 mg capsule Take 1 Cap by mouth every six (6) hours as needed. 30 Cap 0       Allergies   Allergen Reactions    Latex Rash and Swelling         Objective:    Visit Vitals  /78 (BP 1 Location: Right arm, BP Patient Position: Sitting)   Pulse 82   Temp 98.2 °F (36.8 °C)   Resp 16   Ht 6' 1\" (1.854 m)   Wt 264 lb (119.7 kg)   SpO2 98%   BMI 34.83 kg/m²       Lazarus Lightning. is a 26 y.o. male who appears well and in no distress. He is awake, alert, and oriented. A limited skin examination was completed including the left temporal hairline.     There is a 23 x 14 mm violaceous pedunculated papule on the left temporal hairline, most consistent with hemangioma. There is a flesh colored papule on the scalp consistent with scalp nevus,no concerning features. Left temporal hairline    Assessment/Plan:    1. Neoplasm of Uncertain Behavior, left temporal hairline, favor hemangioma. The differential diagnoses were discussed. A shave removal was advised to address this lesion. The procedure was reviewed and verbal and written consent were obtained. The risks of pain, bleeding, infection, recurrence and scar were discussed. I performed the procedure. The site was cleansed and anesthetized with 1% Lidocaine with Epinephrine 1:100,000. A shave removal was performed to remove the lesion in its clinical entirety. Drysol was used for hemostasis. The wound was bandaged and care reviewed. The specimen was sent to pathology. I will contact the patient with the results and any further treatment that may be necessary. 2. Normal nevus of scalp. The diagnosis of normal nevi was reviewed. I discussed sun protection, sunscreen use, the warning signs of skin cancer, the need for self-skin examinations, and the need for regular practitioner exams. The patient should follow up sooner as needed if new, changing, or symptomatic skin lesions arise. 3. H/o Psoriasis. This plan was reviewed with the patient and patient agrees. All questions were answered. This scribe documentation was reviewed by me and accurately reflects the examination and decisions made by me. [facility]   OFFICE PROCEDURE PROGRESS NOTE   Chart reviewed for the following:   [place], have reviewed the History, Physical and updated the Allergic reactions for Janelle Johnston. Frutoso Goldabby TIME OUT performed immediately prior to start of procedure:   [place], [address], have performed the following reviews on Janelle Johnston.   prior to the start of the procedure: * Patient was identified by name and date of birth   * Agreement on procedure being performed was verified   * Risks and Benefits explained to the patient   * Procedure site verified and marked as necessary   * Patient was positioned for comfort   * Consent was signed and verified     Time: 9:45 AM  Date of procedure: 3/20/2019  Procedure performed by: Henrietta Dawson.  Brianna Corea  Provider assisted by: Doris Romero LPN   Patient assisted by: self   How tolerated by patient: tolerated the procedure well with no complications   Comments: none

## 2019-03-20 NOTE — PATIENT INSTRUCTIONS
Chief Complaint   Patient presents with    Skin Exam     Areas of concern: Left temple - mole     Self Skin Exam and Sunscreens    Early detection and treatment is essential in the treatment of all forms of skin cancer. If caught early, all forms of skin cancer are curable. In addition to your regular visits, you should perform a monthly skin examination. Over time, you become familiar with what is normally found on your skin and can identify new or suspicious spots. One of the screening tools you can use to assess your skin is to follow the ABCDEs:    A= Asymmetry (One half is unlike the other half)     B= Border (An irregular, scalloped or poorly defined edge)    C= Color (Is varied from one area to another, has shades of tan, brown/ black, white, red or blue)    D= Diameter (Spots larger than 6mm or a pencil eraser)    E= Evolving (New spots or one that is changing in size, shape, or color)    A follow- up interval will be customized based on your history of skin cancer or level of skin damage and risk factors. In any case, if you notice a suspicious or new spot, an appointment should be arranged between regular visits. Everyone should use sunscreen and sun-safe practices, which is especially important for those with a personal or family history of skin cancer. Suggestions for this include:    1. Use daily moisturizers containing SPF 30 or higher. 2. Wear long sleeve clothing with UPF ratings and a broad-brimmed hat. 3. Apply sunscreen with SPF 30 or higher to all sun exposed areas if you are going to be in the sun. A broad spectrum UVA/ UVB sunscreen is best.  Dont forget to REAPPLY every two hours or more often if swimming or sweating! 4. Avoid outside activities during peak sun hours, especially in the summer (10am- 2pm). 5. DO NOT use tanning beds.     Using sunscreen and sun-safe practices can help reduce the likelihood of developing skin cancer or additional skin cancers in those previously diagnosed.

## 2022-07-22 ENCOUNTER — APPOINTMENT (OUTPATIENT)
Dept: CT IMAGING | Age: 37
End: 2022-07-22
Attending: FAMILY MEDICINE
Payer: MEDICAID

## 2022-07-22 ENCOUNTER — HOSPITAL ENCOUNTER (EMERGENCY)
Age: 37
Discharge: HOME | End: 2022-07-22
Attending: FAMILY MEDICINE
Payer: MEDICAID

## 2022-07-22 VITALS
DIASTOLIC BLOOD PRESSURE: 117 MMHG | TEMPERATURE: 98.4 F | WEIGHT: 264 LBS | SYSTOLIC BLOOD PRESSURE: 162 MMHG | OXYGEN SATURATION: 97 % | HEART RATE: 83 BPM | BODY MASS INDEX: 35.76 KG/M2 | HEIGHT: 72 IN | RESPIRATION RATE: 14 BRPM

## 2022-07-22 DIAGNOSIS — M54.42 CHRONIC BILATERAL LOW BACK PAIN WITH BILATERAL SCIATICA: Primary | ICD-10-CM

## 2022-07-22 DIAGNOSIS — G89.29 CHRONIC BILATERAL LOW BACK PAIN WITH BILATERAL SCIATICA: Primary | ICD-10-CM

## 2022-07-22 DIAGNOSIS — M54.41 CHRONIC BILATERAL LOW BACK PAIN WITH BILATERAL SCIATICA: Primary | ICD-10-CM

## 2022-07-22 PROCEDURE — 72131 CT LUMBAR SPINE W/O DYE: CPT

## 2022-07-22 PROCEDURE — 74011250636 HC RX REV CODE- 250/636: Performed by: FAMILY MEDICINE

## 2022-07-22 PROCEDURE — 96372 THER/PROPH/DIAG INJ SC/IM: CPT

## 2022-07-22 PROCEDURE — 99284 EMERGENCY DEPT VISIT MOD MDM: CPT

## 2022-07-22 RX ORDER — DEXTROMETHORPHAN HYDROBROMIDE, GUAIFENESIN 5; 100 MG/5ML; MG/5ML
650 LIQUID ORAL
Qty: 15 TABLET | Refills: 0 | Status: SHIPPED | OUTPATIENT
Start: 2022-07-22 | End: 2022-07-27

## 2022-07-22 RX ORDER — KETOROLAC TROMETHAMINE 30 MG/ML
30 INJECTION, SOLUTION INTRAMUSCULAR; INTRAVENOUS
Status: COMPLETED | OUTPATIENT
Start: 2022-07-22 | End: 2022-07-22

## 2022-07-22 RX ORDER — NAPROXEN 500 MG/1
500 TABLET ORAL
Qty: 14 TABLET | Refills: 0 | Status: SHIPPED | OUTPATIENT
Start: 2022-07-22 | End: 2022-07-29

## 2022-07-22 RX ORDER — CYCLOBENZAPRINE HCL 10 MG
10 TABLET ORAL
Qty: 15 TABLET | Refills: 0 | Status: SHIPPED | OUTPATIENT
Start: 2022-07-22 | End: 2022-07-27

## 2022-07-22 RX ADMIN — KETOROLAC TROMETHAMINE 30 MG: 30 INJECTION, SOLUTION INTRAMUSCULAR; INTRAVENOUS at 20:29

## 2022-07-22 NOTE — Clinical Note
[address] [facility] EMERGENCY DEPARTMENT  [address] [facility] [address]  [phone]    Work/School Note    Date: 7/22/2022    To Whom It May concern:    Chris Davila. was seen and treated today in the emergency room by the following provider(s):  Attending Provider: Ksenia Gutierrez DO. Chris Davila. is excused from work/school on 7/22/2022 through 7/24/2022. He is medically clear to return to work/school on 7/25/2022.          Sincerely,          Rhiannon Javier DO

## 2022-07-23 NOTE — ED TRIAGE NOTES
Pt has back pain with no trauma. Has a long history of back pain. States tingling and pain when walking.

## 2022-07-24 NOTE — ED PROVIDER NOTES
EMERGENCY DEPARTMENT HISTORY AND PHYSICAL EXAM      Date: 7/22/2022  Patient Name: Antony Acosta. History of Presenting Illness     Chief Complaint   Patient presents with    Back Pain       History Provided By:     HPI: Antony Acosta., is a very pleasant 43 y.o. male presenting to the ED with a chief complaint of lower back pain. Patient states his symptoms started over 10 years ago. Pain is achy on either side of his back. He occasional sharp pains in the back of his leg. No numbness tingling or weakness in extremities. No saddle anesthesia. No difficulty urinating or stooling. No fevers. The patient denies any other symptoms at this time. PCP: Shahid Holguin MD    No current facility-administered medications on file prior to encounter. Current Outpatient Medications on File Prior to Encounter   Medication Sig Dispense Refill    ibuprofen 100 mg tablet Take 100 mg by mouth every six (6) hours as needed for Pain. aspirin-acetaminophen-caffeine (EXCEDRIN ES) 250-250-65 mg per tablet Take 1 Tab by mouth. diphenhydrAMINE (BENADRYL) 25 mg capsule Take 1 Cap by mouth every six (6) hours as needed. 30 Cap 0       Past History     Past Medical History:  Past Medical History:   Diagnosis Date    Bipolar 1 disorder ([address]     Contact dermatitis and other eczema, due to unspecified cause     psoriasis    Diabetes ([address]     Ill-defined condition     psoriasis    Joint pain     Other ill-defined conditions(799.89)     psoriasis    Psoriasis     Sun-damaged skin     Tanning bed exposure        Past Surgical History:  History reviewed. No pertinent surgical history.     Family History:  Family History   Problem Relation Age of Onset    Seizures Mother     Diabetes Father     Cancer Paternal Grandmother         breast cancer    Stroke Paternal Grandmother        Social History:  Social History     Tobacco Use    Smoking status: Every Day     Packs/day: 1.50     Types: Cigarettes    Smokeless tobacco: Never   Vaping Use    Vaping Use: Never used   Substance Use Topics    Alcohol use: No     Comment: occasional    Drug use: Never       Allergies: Allergies   Allergen Reactions    Latex Rash and Swelling         Review of Systems     Review of Systems   Constitutional:  Negative for activity change, appetite change, chills, fatigue and fever. HENT:  Negative for congestion and sore throat. Eyes:  Negative for photophobia and visual disturbance. Respiratory:  Negative for cough, shortness of breath and wheezing. Cardiovascular:  Negative for chest pain, palpitations and leg swelling. Gastrointestinal:  Negative for abdominal pain, diarrhea, nausea and vomiting. Endocrine: Negative for cold intolerance and heat intolerance. Musculoskeletal:  Positive for back pain. Negative for gait problem and joint swelling. Skin:  Negative for color change and rash. Neurological:  Negative for dizziness and headaches. Physical Exam     Physical Exam  Constitutional:       General: He is not in acute distress. Appearance: Normal appearance. He is not toxic-appearing. HENT:      Head: Normocephalic and atraumatic. Right Ear: External ear normal.      Left Ear: External ear normal.      Mouth/Throat:      Mouth: Mucous membranes are moist.      Pharynx: Oropharynx is clear. Eyes:      Extraocular Movements: Extraocular movements intact. Conjunctiva/sclera: Conjunctivae normal.   Cardiovascular:      Rate and Rhythm: Normal rate and regular rhythm. Pulses: Normal pulses. Heart sounds: Normal heart sounds. Pulmonary:      Effort: Pulmonary effort is normal. No respiratory distress. Breath sounds: Normal breath sounds. No wheezing, rhonchi or rales. Abdominal:      General: There is no distension. Palpations: Abdomen is soft. Tenderness: There is no abdominal tenderness. There is no guarding or rebound. Musculoskeletal:         General: No deformity. Cervical back: Normal range of motion and neck supple. Right lower leg: No edema. Left lower leg: No edema. Comments: Lumbar paraspinal tenderness. Mild tenderness over L3-L4. No motor nor sensory deficits lower extremities. Skin:     Capillary Refill: Capillary refill takes less than 2 seconds. Findings: No erythema or rash. Neurological:      General: No focal deficit present. Mental Status: He is alert and oriented to person, place, and time. Gait: Gait normal.   Psychiatric:         Mood and Affect: Mood normal.         Behavior: Behavior normal.       Lab and Diagnostic Study Results     Labs -   No results found for this or any previous visit (from the past 12 hour(s)). Radiologic Studies -   @lastxrresult@  CT Results  (Last 48 hours)                 07/22/22 2109  CT SPINE LUMB WO CONT Final result    Impression:  Unremarkable lumbar spine           Narrative:  EXAM:  CT LUMBAR SPINE WITHOUT  CONTRAST       INDICATION: Chronic lower back pain. COMPARISON: Abdomen pelvis CT 4/15/2014. CONTRAST:  None. TECHNIQUE: Multislice helical CT of the lumbar spine was performed without   intravenous contrast administration. Coronal and sagittal reformats were   generated. CT dose reduction was achieved through use of a standardized   protocol tailored for this examination and automatic exposure control for dose   modulation. FINDINGS:       The alignment is within normal limits. There is no fracture or compression   deformity. The paravertebral soft tissues are within normal limits. Lower thoracic spine: No herniation or stenosis. L1-L2: There is no spinal canal or neural foraminal stenosis. L2-L3: There is no spinal canal or neural foraminal stenosis. L3-L4: There is no spinal canal or neural foraminal stenosis. L4-L5: There is no spinal canal or neural foraminal stenosis.        L5-S1: There is no spinal canal or neural foraminal stenosis. CXR Results  (Last 48 hours)      None              Medical Decision Making   - I am the first provider for this patient. - I reviewed the vital signs, available nursing notes, past medical history, past surgical history, family history and social history. - Initial assessment performed. The patients presenting problems have been discussed, and they are in agreement with the care plan formulated and outlined with them. I have encouraged them to ask questions as they arise throughout their visit. Vital Signs-Reviewed the patient's vital signs. No data found. ED Course/ Provider Notes (Medical Decision Making):     Patient presented to the emergency department with the aforementioned chief complaint. On examination the patient is nontoxic. Vitals were reviewed per above. No findings consistent with infectious process nor cauda equina. CT lumbar spine without acute abnormality. Patient feeling better after Toradol. Discussed supportive measures for his likely lumbar paraspinal muscle/sciatica strain. However information to follow-up with orthopedic spine  surgery given chronicity of symptoms. Jw Cortez was given a thorough list of signs and symptoms that would warrant an immediate return to the emergency department. Otherwise Jw Cortez will follow up with PCP. Procedures   Medical Decision Makingedical Decision Making  Performed by: Lyman Seip, DO  Procedures  None       Disposition   Disposition:     Home     All of the diagnostic tests were reviewed and questions answered. Diagnosis, care plan and treatment options were discussed. The patient understands the instructions and will follow up as directed. The patients results have been reviewed with them. They have been counseled regarding their diagnosis.   The patient verbally convey understanding and agreement of the signs, symptoms, diagnosis, treatment and prognosis and additionally agrees to follow up as recommended with their PCP in 24 - 48 hours. They also agree with the care-plan and convey that all of their questions have been answered. I have also put together some discharge instructions for them that include: 1) educational information regarding their diagnosis, 2) how to care for their diagnosis at home, as well a 3) list of reasons why they would want to return to the ED prior to their follow-up appointment, should their condition change. DISCHARGE PLAN:    1. Cannot display discharge medications since this patient is not currently admitted. 2.   Follow-up Information       Follow up With Specialties Details Why Contact Info    Your primary care doctor  Schedule an appointment as soon as possible for a visit in 2 days      Morse Fabry, MD Orthopedic Surgery Call   [address] [facility]  [phone]                3.  Return to ED if worse       4. Discharge Medication List as of 7/22/2022  9:22 PM        START taking these medications    Details   acetaminophen (Tylenol 8 Hour) 650 mg TbER Take 1 Tablet by mouth every eight (8) hours as needed for Pain for up to 5 days. , Normal, Disp-15 Tablet, R-0      cyclobenzaprine (FLEXERIL) 10 mg tablet Take 1 Tablet by mouth three (3) times daily as needed for Muscle Spasm(s) for up to 5 days. , Normal, Disp-15 Tablet, R-0      naproxen (NAPROSYN) 500 mg tablet Take 1 Tablet by mouth two (2) times daily as needed for Pain for up to 7 days. , Normal, Disp-14 Tablet, R-0           CONTINUE these medications which have NOT CHANGED    Details   ibuprofen 100 mg tablet Take 100 mg by mouth every six (6) hours as needed for Pain., Historical Med      aspirin-acetaminophen-caffeine (EXCEDRIN ES) 250-250-65 mg per tablet Take 1 Tab by mouth., Historical Med      diphenhydrAMINE (BENADRYL) 25 mg capsule Take 1 Cap by mouth every six (6) hours as needed., Normal, Disp-30 Cap, R-0 Diagnosis     Clinical Impression:    1. Chronic bilateral low back pain with bilateral sciatica        Attestations:    Anna Douglass, DO    Please note that this dictation was completed with [email or web address], the computer voice recognition software. Quite often unanticipated grammatical, syntax, homophones, and other interpretive errors are inadvertently transcribed by the computer software. Please disregard these errors. Please excuse any errors that have escaped final proofreading. Thank you.

## 2023-07-27 ENCOUNTER — OFFICE VISIT (OUTPATIENT)
Facility: CLINIC | Age: 38
End: 2023-07-27
Payer: MEDICAID

## 2023-07-27 VITALS
TEMPERATURE: 98.4 F | RESPIRATION RATE: 17 BRPM | HEIGHT: 73 IN | HEART RATE: 94 BPM | DIASTOLIC BLOOD PRESSURE: 82 MMHG | WEIGHT: 243.6 LBS | BODY MASS INDEX: 32.29 KG/M2 | SYSTOLIC BLOOD PRESSURE: 141 MMHG | OXYGEN SATURATION: 96 %

## 2023-07-27 DIAGNOSIS — L40.9 PSORIASIS: ICD-10-CM

## 2023-07-27 DIAGNOSIS — Z11.4 ENCOUNTER FOR SCREENING FOR HIV: ICD-10-CM

## 2023-07-27 DIAGNOSIS — Z13.6 ENCOUNTER FOR LIPID SCREENING FOR CARDIOVASCULAR DISEASE: ICD-10-CM

## 2023-07-27 DIAGNOSIS — Z13.1 SCREENING FOR DIABETES MELLITUS (DM): ICD-10-CM

## 2023-07-27 DIAGNOSIS — Z13.220 ENCOUNTER FOR LIPID SCREENING FOR CARDIOVASCULAR DISEASE: ICD-10-CM

## 2023-07-27 DIAGNOSIS — Z00.00 ENCOUNTER FOR WELLNESS EXAMINATION IN ADULT: Primary | ICD-10-CM

## 2023-07-27 DIAGNOSIS — I10 PRIMARY HYPERTENSION: ICD-10-CM

## 2023-07-27 DIAGNOSIS — Z11.59 NEED FOR HEPATITIS C SCREENING TEST: ICD-10-CM

## 2023-07-27 DIAGNOSIS — F31.9 BIPOLAR DISEASE, CHRONIC (HCC): ICD-10-CM

## 2023-07-27 DIAGNOSIS — Z23 ENCOUNTER FOR IMMUNIZATION: ICD-10-CM

## 2023-07-27 PROCEDURE — 3077F SYST BP >= 140 MM HG: CPT

## 2023-07-27 PROCEDURE — 99204 OFFICE O/P NEW MOD 45 MIN: CPT

## 2023-07-27 PROCEDURE — 99385 PREV VISIT NEW AGE 18-39: CPT

## 2023-07-27 PROCEDURE — 3079F DIAST BP 80-89 MM HG: CPT

## 2023-07-27 RX ORDER — AMLODIPINE BESYLATE 10 MG/1
10 TABLET ORAL DAILY
Qty: 60 TABLET | Refills: 0 | Status: SHIPPED | OUTPATIENT
Start: 2023-07-27

## 2023-07-27 SDOH — ECONOMIC STABILITY: FOOD INSECURITY: WITHIN THE PAST 12 MONTHS, THE FOOD YOU BOUGHT JUST DIDN'T LAST AND YOU DIDN'T HAVE MONEY TO GET MORE.: NEVER TRUE

## 2023-07-27 SDOH — ECONOMIC STABILITY: HOUSING INSECURITY
IN THE LAST 12 MONTHS, WAS THERE A TIME WHEN YOU DID NOT HAVE A STEADY PLACE TO SLEEP OR SLEPT IN A SHELTER (INCLUDING NOW)?: NO

## 2023-07-27 SDOH — ECONOMIC STABILITY: FOOD INSECURITY: WITHIN THE PAST 12 MONTHS, YOU WORRIED THAT YOUR FOOD WOULD RUN OUT BEFORE YOU GOT MONEY TO BUY MORE.: NEVER TRUE

## 2023-07-27 SDOH — ECONOMIC STABILITY: INCOME INSECURITY: HOW HARD IS IT FOR YOU TO PAY FOR THE VERY BASICS LIKE FOOD, HOUSING, MEDICAL CARE, AND HEATING?: NOT VERY HARD

## 2023-07-27 ASSESSMENT — PATIENT HEALTH QUESTIONNAIRE - PHQ9
SUM OF ALL RESPONSES TO PHQ QUESTIONS 1-9: 12
5. POOR APPETITE OR OVEREATING: 0
9. THOUGHTS THAT YOU WOULD BE BETTER OFF DEAD, OR OF HURTING YOURSELF: 1
SUM OF ALL RESPONSES TO PHQ QUESTIONS 1-9: 12
4. FEELING TIRED OR HAVING LITTLE ENERGY: 3
6. FEELING BAD ABOUT YOURSELF - OR THAT YOU ARE A FAILURE OR HAVE LET YOURSELF OR YOUR FAMILY DOWN: 3
SUM OF ALL RESPONSES TO PHQ9 QUESTIONS 1 & 2: 1
SUM OF ALL RESPONSES TO PHQ QUESTIONS 1-9: 12
8. MOVING OR SPEAKING SO SLOWLY THAT OTHER PEOPLE COULD HAVE NOTICED. OR THE OPPOSITE, BEING SO FIGETY OR RESTLESS THAT YOU HAVE BEEN MOVING AROUND A LOT MORE THAN USUAL: 1
3. TROUBLE FALLING OR STAYING ASLEEP: 0
SUM OF ALL RESPONSES TO PHQ QUESTIONS 1-9: 11
2. FEELING DOWN, DEPRESSED OR HOPELESS: 1
1. LITTLE INTEREST OR PLEASURE IN DOING THINGS: 0
7. TROUBLE CONCENTRATING ON THINGS, SUCH AS READING THE NEWSPAPER OR WATCHING TELEVISION: 3
10. IF YOU CHECKED OFF ANY PROBLEMS, HOW DIFFICULT HAVE THESE PROBLEMS MADE IT FOR YOU TO DO YOUR WORK, TAKE CARE OF THINGS AT HOME, OR GET ALONG WITH OTHER PEOPLE: 2

## 2023-07-27 ASSESSMENT — COLUMBIA-SUICIDE SEVERITY RATING SCALE - C-SSRS
5. HAVE YOU STARTED TO WORK OUT OR WORKED OUT THE DETAILS OF HOW TO KILL YOURSELF? DO YOU INTEND TO CARRY OUT THIS PLAN?: NO
6. HAVE YOU EVER DONE ANYTHING, STARTED TO DO ANYTHING, OR PREPARED TO DO ANYTHING TO END YOUR LIFE?: NO
2. HAVE YOU ACTUALLY HAD ANY THOUGHTS OF KILLING YOURSELF?: YES
3. HAVE YOU BEEN THINKING ABOUT HOW YOU MIGHT KILL YOURSELF?: YES
1. WITHIN THE PAST MONTH, HAVE YOU WISHED YOU WERE DEAD OR WISHED YOU COULD GO TO SLEEP AND NOT WAKE UP?: YES
4. HAVE YOU HAD THESE THOUGHTS AND HAD SOME INTENTION OF ACTING ON THEM?: YES

## 2023-07-27 ASSESSMENT — ENCOUNTER SYMPTOMS
SORE THROAT: 0
RHINORRHEA: 0
DIARRHEA: 0
VOMITING: 0
WHEEZING: 0
ABDOMINAL PAIN: 0
PHOTOPHOBIA: 0
EYE PAIN: 0
BACK PAIN: 0
COUGH: 0
CHEST TIGHTNESS: 0
ALLERGIC/IMMUNOLOGIC NEGATIVE: 1
NAUSEA: 0
SHORTNESS OF BREATH: 0
ABDOMINAL DISTENTION: 0

## 2023-08-06 LAB
ALBUMIN SERPL-MCNC: 4.8 G/DL (ref 4.1–5.1)
ALBUMIN/GLOB SERPL: 1.6 {RATIO} (ref 1.2–2.2)
ALP SERPL-CCNC: 118 IU/L (ref 44–121)
ALT SERPL-CCNC: 53 IU/L (ref 0–44)
AST SERPL-CCNC: 34 IU/L (ref 0–40)
BILIRUB SERPL-MCNC: 0.9 MG/DL (ref 0–1.2)
BUN SERPL-MCNC: 10 MG/DL (ref 6–20)
BUN/CREAT SERPL: 11 (ref 9–20)
CALCIUM SERPL-MCNC: 9.7 MG/DL (ref 8.7–10.2)
CHLORIDE SERPL-SCNC: 103 MMOL/L (ref 96–106)
CHOLEST SERPL-MCNC: 156 MG/DL (ref 100–199)
CO2 SERPL-SCNC: 25 MMOL/L (ref 20–29)
CREAT SERPL-MCNC: 0.95 MG/DL (ref 0.76–1.27)
EGFRCR SERPLBLD CKD-EPI 2021: 104 ML/MIN/1.73
ERYTHROCYTE [DISTWIDTH] IN BLOOD BY AUTOMATED COUNT: 12.6 % (ref 11.6–15.4)
GLOBULIN SER CALC-MCNC: 3 G/DL (ref 1.5–4.5)
GLUCOSE SERPL-MCNC: 184 MG/DL (ref 70–99)
HCT VFR BLD AUTO: 48.1 % (ref 37.5–51)
HDLC SERPL-MCNC: 65 MG/DL
HGB BLD-MCNC: 16.7 G/DL (ref 13–17.7)
LDLC SERPL CALC-MCNC: 79 MG/DL (ref 0–99)
MCH RBC QN AUTO: 30.3 PG (ref 26.6–33)
MCHC RBC AUTO-ENTMCNC: 34.7 G/DL (ref 31.5–35.7)
MCV RBC AUTO: 87 FL (ref 79–97)
PLATELET # BLD AUTO: 176 X10E3/UL (ref 150–450)
POTASSIUM SERPL-SCNC: 4.3 MMOL/L (ref 3.5–5.2)
PROT SERPL-MCNC: 7.8 G/DL (ref 6–8.5)
RBC # BLD AUTO: 5.51 X10E6/UL (ref 4.14–5.8)
SODIUM SERPL-SCNC: 142 MMOL/L (ref 134–144)
TRIGL SERPL-MCNC: 58 MG/DL (ref 0–149)
VLDLC SERPL CALC-MCNC: 12 MG/DL (ref 5–40)
WBC # BLD AUTO: 9.4 X10E3/UL (ref 3.4–10.8)

## 2023-08-07 LAB
HBA1C MFR BLD: 8 % (ref 4.8–5.6)
HCV IGG SERPL QL IA: NON REACTIVE
HIV 1+2 AB+HIV1 P24 AG SERPL QL IA: NON REACTIVE

## 2023-08-08 ENCOUNTER — TELEPHONE (OUTPATIENT)
Facility: CLINIC | Age: 38
End: 2023-08-08

## 2023-08-08 DIAGNOSIS — E11.65 TYPE 2 DIABETES MELLITUS WITH HYPERGLYCEMIA, WITHOUT LONG-TERM CURRENT USE OF INSULIN (HCC): Primary | ICD-10-CM

## 2023-08-08 RX ORDER — METFORMIN HYDROCHLORIDE 500 MG/1
1000 TABLET, EXTENDED RELEASE ORAL
Qty: 180 TABLET | Refills: 0 | Status: SHIPPED | OUTPATIENT
Start: 2023-08-08

## 2023-08-08 NOTE — RESULT ENCOUNTER NOTE
Please contact patient regarding labs results and recommendations. Thank you. Dear Delano Brown.,    Thank you for completing your lab work. I have reviewed your lab results and interpretations are as folllows:    1. A1c 8.0%   Fasting sugars (glucose) greater than 125 or Hemoglobin A1C greater than 6.4% are criteria for diabetes. If not managed, this can lead to complications to include heart attacks, strokes, kidney disease, vision problems, and pain or loss of feeling in the hands and feet. I am recommending starting a medication called Metformin to take daily with food in an effort lower your A1c. Some side effects may include upset stomach, vomiting, or diarrhea. Weight loss along with a healthy diet and routine, almost daily exercise can help manage this diagnosis. Avoid sweets, limit starches (rice, potatoes) and recheck fasting blood sugar and/or A1C in 3 months. Will send medication to pharmacy.      -Liver & kidney function normal  -HIV & Hep C negative  -Blood count, cholesterol normal

## 2023-08-17 ENCOUNTER — NURSE ONLY (OUTPATIENT)
Facility: CLINIC | Age: 38
End: 2023-08-17

## 2023-08-17 VITALS — DIASTOLIC BLOOD PRESSURE: 78 MMHG | SYSTOLIC BLOOD PRESSURE: 136 MMHG

## 2023-08-17 DIAGNOSIS — I10 PRIMARY HYPERTENSION: Primary | ICD-10-CM

## 2023-08-17 NOTE — PROGRESS NOTES
1. \"Have you been to the ER, urgent care clinic since your last visit? Hospitalized since your last visit? \" none    2. \"Have you seen or consulted any other health care providers outside of the [address] [place] [address] since your last visit? \" none     3. For patients aged 43-73: Has the patient had a colonoscopy / FIT/ Cologuard? No      If the patient is female:    4. For patients aged 43-66: Has the patient had a mammogram within the past 2 years? none      5. For patients aged 21-65: Has the patient had a pap smear?  None    Chief Complaint   Patient presents with    Follow-up    Hypertension     Nurse visit only    /78 (Site: Left Upper Arm, Position: Sitting, Cuff Size: Large Adult)      Patient is here to have a B/P check only

## 2023-10-26 ENCOUNTER — OFFICE VISIT (OUTPATIENT)
Facility: CLINIC | Age: 38
End: 2023-10-26
Payer: COMMERCIAL

## 2023-10-26 VITALS
OXYGEN SATURATION: 97 % | HEART RATE: 94 BPM | HEIGHT: 73 IN | WEIGHT: 244 LBS | TEMPERATURE: 97.1 F | RESPIRATION RATE: 16 BRPM | DIASTOLIC BLOOD PRESSURE: 91 MMHG | BODY MASS INDEX: 32.34 KG/M2 | SYSTOLIC BLOOD PRESSURE: 129 MMHG

## 2023-10-26 DIAGNOSIS — E11.65 UNCONTROLLED TYPE 2 DIABETES MELLITUS WITH HYPERGLYCEMIA (HCC): ICD-10-CM

## 2023-10-26 DIAGNOSIS — I10 PRIMARY HYPERTENSION: ICD-10-CM

## 2023-10-26 DIAGNOSIS — I10 PRIMARY HYPERTENSION: Primary | ICD-10-CM

## 2023-10-26 PROCEDURE — 3052F HG A1C>EQUAL 8.0%<EQUAL 9.0%: CPT

## 2023-10-26 PROCEDURE — 3074F SYST BP LT 130 MM HG: CPT

## 2023-10-26 PROCEDURE — 99214 OFFICE O/P EST MOD 30 MIN: CPT

## 2023-10-26 PROCEDURE — 3080F DIAST BP >= 90 MM HG: CPT

## 2023-10-26 RX ORDER — LOSARTAN POTASSIUM 25 MG/1
25 TABLET ORAL DAILY
Qty: 90 TABLET | Refills: 0 | Status: SHIPPED | OUTPATIENT
Start: 2023-10-26

## 2023-10-26 RX ORDER — AMLODIPINE BESYLATE 10 MG/1
10 TABLET ORAL DAILY
Qty: 90 TABLET | Refills: 0 | Status: SHIPPED | OUTPATIENT
Start: 2023-10-26

## 2023-10-26 RX ORDER — METFORMIN HYDROCHLORIDE 500 MG/1
1000 TABLET, EXTENDED RELEASE ORAL
Qty: 180 TABLET | Refills: 0 | Status: SHIPPED | OUTPATIENT
Start: 2023-10-26

## 2023-10-26 ASSESSMENT — ENCOUNTER SYMPTOMS
DIARRHEA: 0
ALLERGIC/IMMUNOLOGIC NEGATIVE: 1
BACK PAIN: 0
ABDOMINAL DISTENTION: 0
WHEEZING: 0
VOMITING: 0
PHOTOPHOBIA: 0
COUGH: 0
EYE PAIN: 0
RHINORRHEA: 0
SHORTNESS OF BREATH: 0
NAUSEA: 0
SORE THROAT: 0
CHEST TIGHTNESS: 0
ABDOMINAL PAIN: 0

## 2023-12-15 ENCOUNTER — HOSPITAL ENCOUNTER (EMERGENCY)
Facility: HOSPITAL | Age: 38
Discharge: HOME | End: 2023-12-15
Attending: EMERGENCY MEDICINE
Payer: COMMERCIAL

## 2023-12-15 VITALS
TEMPERATURE: 97.8 F | RESPIRATION RATE: 18 BRPM | SYSTOLIC BLOOD PRESSURE: 155 MMHG | DIASTOLIC BLOOD PRESSURE: 107 MMHG | OXYGEN SATURATION: 99 % | HEART RATE: 84 BPM | WEIGHT: 240 LBS | HEIGHT: 72 IN | BODY MASS INDEX: 32.51 KG/M2

## 2023-12-15 DIAGNOSIS — J06.9 VIRAL URI WITH COUGH: Primary | ICD-10-CM

## 2023-12-15 DIAGNOSIS — I10 UNCONTROLLED HYPERTENSION: ICD-10-CM

## 2023-12-15 DIAGNOSIS — I10 PRIMARY HYPERTENSION: ICD-10-CM

## 2023-12-15 LAB
FLUAV AG NPH QL IA: NEGATIVE
FLUBV AG NOSE QL IA: NEGATIVE
SARS-COV-2 RNA RESP QL NAA+PROBE: NOT DETECTED
SOURCE: NORMAL

## 2023-12-15 PROCEDURE — 87635 SARS-COV-2 COVID-19 AMP PRB: CPT

## 2023-12-15 PROCEDURE — 6370000000 HC RX 637 (ALT 250 FOR IP): Performed by: EMERGENCY MEDICINE

## 2023-12-15 PROCEDURE — 99283 EMERGENCY DEPT VISIT LOW MDM: CPT

## 2023-12-15 PROCEDURE — 87804 INFLUENZA ASSAY W/OPTIC: CPT

## 2023-12-15 RX ORDER — AMLODIPINE BESYLATE 5 MG/1
10 TABLET ORAL
Status: COMPLETED | OUTPATIENT
Start: 2023-12-15 | End: 2023-12-15

## 2023-12-15 RX ORDER — AMLODIPINE BESYLATE 10 MG/1
10 TABLET ORAL DAILY
Qty: 30 TABLET | Refills: 0 | Status: SHIPPED | OUTPATIENT
Start: 2023-12-15 | End: 2024-01-14

## 2023-12-15 RX ORDER — BENZONATATE 100 MG/1
100 CAPSULE ORAL 3 TIMES DAILY PRN
Qty: 20 CAPSULE | Refills: 0 | Status: SHIPPED | OUTPATIENT
Start: 2023-12-15 | End: 2023-12-22

## 2023-12-15 RX ORDER — NAPROXEN 500 MG/1
500 TABLET ORAL 2 TIMES DAILY
Qty: 10 TABLET | Refills: 0 | Status: SHIPPED | OUTPATIENT
Start: 2023-12-15 | End: 2023-12-15 | Stop reason: SDUPTHER

## 2023-12-15 RX ORDER — BENZONATATE 100 MG/1
100 CAPSULE ORAL 3 TIMES DAILY PRN
Qty: 20 CAPSULE | Refills: 0 | Status: SHIPPED | OUTPATIENT
Start: 2023-12-15 | End: 2023-12-15 | Stop reason: SDUPTHER

## 2023-12-15 RX ORDER — LOSARTAN POTASSIUM 25 MG/1
25 TABLET ORAL DAILY
Qty: 30 TABLET | Refills: 0 | Status: SHIPPED | OUTPATIENT
Start: 2023-12-15 | End: 2024-01-14

## 2023-12-15 RX ORDER — OXYMETAZOLINE HYDROCHLORIDE 0.05 G/100ML
2 SPRAY NASAL 2 TIMES DAILY
Qty: 2 ML | Refills: 0 | Status: SHIPPED | OUTPATIENT
Start: 2023-12-15 | End: 2023-12-18

## 2023-12-15 RX ORDER — OXYMETAZOLINE HYDROCHLORIDE 0.05 G/100ML
2 SPRAY NASAL 2 TIMES DAILY
Qty: 2 ML | Refills: 0 | Status: SHIPPED | OUTPATIENT
Start: 2023-12-15 | End: 2023-12-15 | Stop reason: SDUPTHER

## 2023-12-15 RX ORDER — LOSARTAN POTASSIUM 25 MG/1
25 TABLET ORAL
Status: COMPLETED | OUTPATIENT
Start: 2023-12-15 | End: 2023-12-15

## 2023-12-15 RX ORDER — AMLODIPINE BESYLATE 10 MG/1
10 TABLET ORAL DAILY
Qty: 30 TABLET | Refills: 0 | Status: SHIPPED | OUTPATIENT
Start: 2023-12-15 | End: 2023-12-15 | Stop reason: SDUPTHER

## 2023-12-15 RX ORDER — LOSARTAN POTASSIUM 25 MG/1
25 TABLET ORAL DAILY
Qty: 30 TABLET | Refills: 0 | Status: SHIPPED | OUTPATIENT
Start: 2023-12-15 | End: 2023-12-15 | Stop reason: SDUPTHER

## 2023-12-15 RX ORDER — NAPROXEN 500 MG/1
500 TABLET ORAL 2 TIMES DAILY
Qty: 10 TABLET | Refills: 0 | Status: SHIPPED | OUTPATIENT
Start: 2023-12-15 | End: 2023-12-20

## 2023-12-15 RX ADMIN — LOSARTAN POTASSIUM 25 MG: 25 TABLET, FILM COATED ORAL at 09:21

## 2023-12-15 RX ADMIN — AMLODIPINE BESYLATE 10 MG: 5 TABLET ORAL at 09:21

## 2023-12-15 ASSESSMENT — PAIN - FUNCTIONAL ASSESSMENT: PAIN_FUNCTIONAL_ASSESSMENT: NONE - DENIES PAIN

## 2023-12-15 NOTE — ED PROVIDER NOTES
[facility] EMERGENCY DEPT  EMERGENCY DEPARTMENT HISTORY AND PHYSICAL EXAM      Date: 12/15/2023  Patient Name: Benigno Easley. MRN: [id number]  Birthdate 1985  Date of evaluation: 12/15/2023  Provider: Abdon Bernard MD   Note Started: 9:13 AM EST 12/15/23    HISTORY OF PRESENT ILLNESS     Chief Complaint   Patient presents with    Cough    Sinusitis       History Provided By: Patient    HPI: Benigno Easley. is a 34 y.o. male past medical history of bipolar disorder, psoriasis presents for evaluation of cough and runny nose. Also had fever 2 days ago. Symptoms started 2 days ago. Multiple people at work are sick with similar symptoms. 1 tested positive for COVID. Patient is concerned he has COVID. Did take Tylenol earlier in the week and Excedrin Migraine last night. Has been using cough drops as well. Of note, patient is significantly hypertensive. He reports that he has been prescribed losartan and amlodipine but has not been filling the prescription secondary to cost of the medication. He has not discussed this with his PCP. PAST MEDICAL HISTORY   Past Medical History:  Past Medical History:   Diagnosis Date    Bipolar 1 disorder ([address])     Contact dermatitis and other eczema, due to unspecified cause     psoriasis    Ill-defined condition     psoriasis    Joint pain     Other ill-defined conditions(853.89)     psoriasis    Psoriasis     Sun-damaged skin     Tanning bed exposure        Past Surgical History:  History reviewed. No pertinent surgical history.     Family History:  Family History   Problem Relation Age of Onset    Seizures Mother     Diabetes Father     Cancer Paternal Grandmother         breast cancer    Stroke Paternal Grandmother        Social History:  Social History     Tobacco Use    Smoking status: Every Day     Packs/day: 1.5     Types: Cigarettes    Smokeless tobacco: Never   Vaping Use    Vaping Use: Never used   Substance Use Topics    Alcohol use: Yes     Comment: on

## 2023-12-15 NOTE — ED NOTES
BP now 155/107, given script for BP meds, will follow up with PCP     Rey Carrasco, MARCELA  12/15/23 0918

## 2023-12-22 DIAGNOSIS — E11.65 UNCONTROLLED TYPE 2 DIABETES MELLITUS WITH HYPERGLYCEMIA (HCC): ICD-10-CM

## 2023-12-22 NOTE — TELEPHONE ENCOUNTER
Patient called to have his medications     metFORMIN (GLUCOPHAGE-XR) 500 MG extended release tablet [[id number]]     dapagliflozin (FARXIGA) 10 MG tablet [[id number]     transferred to [facility] [address] [facility]

## 2023-12-27 RX ORDER — METFORMIN HYDROCHLORIDE 500 MG/1
1000 TABLET, EXTENDED RELEASE ORAL
Qty: 180 TABLET | Refills: 0 | Status: SHIPPED | OUTPATIENT
Start: 2023-12-27

## 2024-05-21 ENCOUNTER — OFFICE VISIT (OUTPATIENT)
Facility: CLINIC | Age: 39
End: 2024-05-21
Payer: COMMERCIAL

## 2024-05-21 VITALS
HEIGHT: 72 IN | DIASTOLIC BLOOD PRESSURE: 65 MMHG | SYSTOLIC BLOOD PRESSURE: 120 MMHG | TEMPERATURE: 98 F | OXYGEN SATURATION: 98 % | WEIGHT: 248 LBS | BODY MASS INDEX: 33.59 KG/M2 | RESPIRATION RATE: 16 BRPM | HEART RATE: 98 BPM

## 2024-05-21 DIAGNOSIS — F31.9 BIPOLAR DISEASE, CHRONIC (HCC): ICD-10-CM

## 2024-05-21 DIAGNOSIS — E78.5 HYPERLIPIDEMIA LDL GOAL <70: ICD-10-CM

## 2024-05-21 DIAGNOSIS — E11.65 UNCONTROLLED TYPE 2 DIABETES MELLITUS WITH HYPERGLYCEMIA (HCC): Primary | ICD-10-CM

## 2024-05-21 DIAGNOSIS — I10 PRIMARY HYPERTENSION: ICD-10-CM

## 2024-05-21 DIAGNOSIS — E11.65 UNCONTROLLED TYPE 2 DIABETES MELLITUS WITH HYPERGLYCEMIA (HCC): ICD-10-CM

## 2024-05-21 LAB — HBA1C MFR BLD: 7.1 %

## 2024-05-21 PROCEDURE — 99214 OFFICE O/P EST MOD 30 MIN: CPT

## 2024-05-21 PROCEDURE — 3078F DIAST BP <80 MM HG: CPT

## 2024-05-21 PROCEDURE — 83036 HEMOGLOBIN GLYCOSYLATED A1C: CPT

## 2024-05-21 PROCEDURE — 3074F SYST BP LT 130 MM HG: CPT

## 2024-05-21 RX ORDER — ATORVASTATIN CALCIUM 10 MG/1
10 TABLET, FILM COATED ORAL DAILY
Qty: 30 TABLET | Refills: 3 | Status: SHIPPED | OUTPATIENT
Start: 2024-05-21

## 2024-05-21 RX ORDER — AMLODIPINE BESYLATE 10 MG/1
10 TABLET ORAL DAILY
Qty: 30 TABLET | Refills: 3 | Status: SHIPPED | OUTPATIENT
Start: 2024-05-21

## 2024-05-21 RX ORDER — METFORMIN HYDROCHLORIDE 500 MG/1
1000 TABLET, EXTENDED RELEASE ORAL
Qty: 60 TABLET | Refills: 3 | Status: SHIPPED | OUTPATIENT
Start: 2024-05-21

## 2024-05-21 RX ORDER — LOSARTAN POTASSIUM 25 MG/1
25 TABLET ORAL DAILY
Qty: 30 TABLET | Refills: 3 | Status: SHIPPED | OUTPATIENT
Start: 2024-05-21

## 2024-05-21 ASSESSMENT — PATIENT HEALTH QUESTIONNAIRE - PHQ9
SUM OF ALL RESPONSES TO PHQ QUESTIONS 1-9: 0
2. FEELING DOWN, DEPRESSED OR HOPELESS: NOT AT ALL
SUM OF ALL RESPONSES TO PHQ QUESTIONS 1-9: 0
SUM OF ALL RESPONSES TO PHQ9 QUESTIONS 1 & 2: 0
SUM OF ALL RESPONSES TO PHQ QUESTIONS 1-9: 0
SUM OF ALL RESPONSES TO PHQ QUESTIONS 1-9: 0
1. LITTLE INTEREST OR PLEASURE IN DOING THINGS: NOT AT ALL

## 2024-05-21 ASSESSMENT — ENCOUNTER SYMPTOMS
BACK PAIN: 0
ABDOMINAL PAIN: 0
RHINORRHEA: 0
COUGH: 0
ALLERGIC/IMMUNOLOGIC NEGATIVE: 1
CHEST TIGHTNESS: 0
EYE PAIN: 0
WHEEZING: 0
ABDOMINAL DISTENTION: 0
SHORTNESS OF BREATH: 0
DIARRHEA: 0
SORE THROAT: 0
VOMITING: 0
PHOTOPHOBIA: 0
NAUSEA: 0

## 2024-05-21 NOTE — PROGRESS NOTES
Chief Complaint   Patient presents with    Follow-up       /65   Pulse 98   Temp 98 °F (36.7 °C)   Resp 16   Ht 1.829 m (6')   Wt 112.5 kg (248 lb)   SpO2 98%   BMI 33.63 kg/m²   1. Have you been to the ER, urgent care clinic since your last visit?  Hospitalized since your last visit?Yes Reason for visit: Headaches; blurry vision    2. Have you seen or consulted any other health care providers outside of the [facility] since your last visit?  Include any pap smears or colon screening. No    
afford Farxiga. Taking medication daily.   Currently on ARB. Has not seen eye specialist. No statin    HTN-  he is currently taking amlodipine and losartanfor BP control. Says he had to be seen by UC because he had headache, BP was elevated, ran out of medicine.  Pt is taking medications as instructed, no medication side effects noted, no TIA's, no chest pain on exertion, no dyspnea on exertion, no swelling of ankles.     Mood-history of bipolar disorder.  Not currently taking any medication.  Mood is stable per patient.    Health Maintenance  Health Maintenance Due   Topic Date Due    Hepatitis B vaccine (1 of 3 - 3-dose series) Never done    Diabetic foot exam  Never done    Diabetic Alb to Cr ratio (uACR) test  Never done    Diabetic retinal exam  Never done       Vitals:     Vitals:    05/21/24 0910   BP: 120/65   Pulse: 98   Resp: 16   Temp: 98 °F (36.7 °C)   SpO2: 98%   Weight: 112.5 kg (248 lb)   Height: 1.829 m (6')     Body mass index is 33.63 kg/m².    Wt Readings from Last 3 Encounters:   05/21/24 112.5 kg (248 lb)   12/15/23 108.9 kg (240 lb)   10/26/23 110.7 kg (244 lb)       Medications:     Current Outpatient Medications   Medication Sig Dispense Refill    amLODIPine (NORVASC) 10 MG tablet Take 1 tablet by mouth daily 30 tablet 3    losartan (COZAAR) 25 MG tablet Take 1 tablet by mouth daily 30 tablet 3    metFORMIN (GLUCOPHAGE-XR) 500 MG extended release tablet Take 2 tablets by mouth daily (with breakfast) 60 tablet 3    empagliflozin (JARDIANCE) 25 MG tablet Take 1 tablet by mouth daily 30 tablet 3    atorvastatin (LIPITOR) 10 MG tablet Take 1 tablet by mouth daily 30 tablet 3    naproxen (NAPROSYN) 500 MG tablet Take 1 tablet by mouth 2 times daily for 5 days 10 tablet 0     No current facility-administered medications for this visit.        Review of Systems   Review of Systems   Constitutional:  Negative for appetite change, chills, fatigue and fever.   HENT:  Negative for congestion, ear

## 2024-05-31 LAB
ALBUMIN SERPL-MCNC: 4.7 G/DL (ref 4.1–5.1)
ALBUMIN/GLOB SERPL: 1.7 {RATIO} (ref 1.2–2.2)
ALP SERPL-CCNC: 93 IU/L (ref 44–121)
ALT SERPL-CCNC: 46 IU/L (ref 0–44)
AST SERPL-CCNC: 36 IU/L (ref 0–40)
BILIRUB SERPL-MCNC: 0.8 MG/DL (ref 0–1.2)
BUN SERPL-MCNC: 14 MG/DL (ref 6–24)
BUN/CREAT SERPL: 15 (ref 9–20)
CALCIUM SERPL-MCNC: 9.4 MG/DL (ref 8.7–10.2)
CHLORIDE SERPL-SCNC: 104 MMOL/L (ref 96–106)
CHOLEST SERPL-MCNC: 132 MG/DL (ref 100–199)
CO2 SERPL-SCNC: 22 MMOL/L (ref 20–29)
CREAT SERPL-MCNC: 0.96 MG/DL (ref 0.76–1.27)
EGFRCR SERPLBLD CKD-EPI 2021: 102 ML/MIN/1.73
ERYTHROCYTE [DISTWIDTH] IN BLOOD BY AUTOMATED COUNT: 12.5 % (ref 11.6–15.4)
GLOBULIN SER CALC-MCNC: 2.8 G/DL (ref 1.5–4.5)
GLUCOSE SERPL-MCNC: 128 MG/DL (ref 70–99)
HBA1C MFR BLD: 7.2 % (ref 4.8–5.6)
HCT VFR BLD AUTO: 47.6 % (ref 37.5–51)
HDLC SERPL-MCNC: 60 MG/DL
HGB BLD-MCNC: 15.8 G/DL (ref 13–17.7)
LDLC SERPL CALC-MCNC: 60 MG/DL (ref 0–99)
MCH RBC QN AUTO: 29.4 PG (ref 26.6–33)
MCHC RBC AUTO-ENTMCNC: 33.2 G/DL (ref 31.5–35.7)
MCV RBC AUTO: 89 FL (ref 79–97)
PLATELET # BLD AUTO: 203 X10E3/UL (ref 150–450)
POTASSIUM SERPL-SCNC: 4.2 MMOL/L (ref 3.5–5.2)
PROT SERPL-MCNC: 7.5 G/DL (ref 6–8.5)
RBC # BLD AUTO: 5.38 X10E6/UL (ref 4.14–5.8)
SODIUM SERPL-SCNC: 142 MMOL/L (ref 134–144)
TRIGL SERPL-MCNC: 55 MG/DL (ref 0–149)
VLDLC SERPL CALC-MCNC: 12 MG/DL (ref 5–40)
WBC # BLD AUTO: 11.1 X10E3/UL (ref 3.4–10.8)

## 2024-05-31 NOTE — RESULT ENCOUNTER NOTE
Please contact patient regarding labs results and recommendations. Thank you.     Dear Ricardo Stephens Jr.,    Thank you for completing your lab work. I have reviewed your lab results and interpretations are as follows:    -A1c improved to 7.2%.  Continue meds.  Recommend continued healthy lifestyle factors to include water intake, 150 minutes of moderate-intensity physical activity per week, and well-balanced diet high in fruits and vegetables and low in saturated fats, low in carbohydrates, and low in processed sugars.    -White count slightly elevated.  Typically can be from illness or infection or recent steroid use.  Otherwise normal will continue to monitor    Kidney function normal, liver function improved.    -Cholesterol looks great!  Continue current meds.

## 2024-06-01 LAB
ALBUMIN/CREAT UR: 17 MG/G CREAT (ref 0–29)
CREAT UR-MCNC: 122.7 MG/DL
MICROALBUMIN UR-MCNC: 20.3 UG/ML

## 2024-09-03 ENCOUNTER — OFFICE VISIT (OUTPATIENT)
Facility: CLINIC | Age: 39
End: 2024-09-03
Payer: COMMERCIAL

## 2024-09-03 VITALS
RESPIRATION RATE: 16 BRPM | HEART RATE: 104 BPM | WEIGHT: 247 LBS | SYSTOLIC BLOOD PRESSURE: 114 MMHG | BODY MASS INDEX: 34.58 KG/M2 | TEMPERATURE: 98.2 F | HEIGHT: 71 IN | OXYGEN SATURATION: 96 % | DIASTOLIC BLOOD PRESSURE: 82 MMHG

## 2024-09-03 DIAGNOSIS — I10 PRIMARY HYPERTENSION: ICD-10-CM

## 2024-09-03 DIAGNOSIS — E78.5 HYPERLIPIDEMIA LDL GOAL <70: ICD-10-CM

## 2024-09-03 DIAGNOSIS — F17.210 TOBACCO DEPENDENCE DUE TO CIGARETTES: ICD-10-CM

## 2024-09-03 DIAGNOSIS — F31.9 BIPOLAR DISEASE, CHRONIC (HCC): ICD-10-CM

## 2024-09-03 DIAGNOSIS — E11.65 UNCONTROLLED TYPE 2 DIABETES MELLITUS WITH HYPERGLYCEMIA (HCC): Primary | ICD-10-CM

## 2024-09-03 DIAGNOSIS — E11.65 UNCONTROLLED TYPE 2 DIABETES MELLITUS WITH HYPERGLYCEMIA (HCC): ICD-10-CM

## 2024-09-03 LAB
GLUCOSE, POC: 203 MG/DL
HBA1C MFR BLD: 7.1 %

## 2024-09-03 PROCEDURE — 83036 HEMOGLOBIN GLYCOSYLATED A1C: CPT

## 2024-09-03 PROCEDURE — 3051F HG A1C>EQUAL 7.0%<8.0%: CPT

## 2024-09-03 PROCEDURE — 82962 GLUCOSE BLOOD TEST: CPT

## 2024-09-03 PROCEDURE — 3074F SYST BP LT 130 MM HG: CPT

## 2024-09-03 PROCEDURE — 99214 OFFICE O/P EST MOD 30 MIN: CPT

## 2024-09-03 PROCEDURE — 3079F DIAST BP 80-89 MM HG: CPT

## 2024-09-03 RX ORDER — ATORVASTATIN CALCIUM 10 MG/1
10 TABLET, FILM COATED ORAL DAILY
Qty: 30 TABLET | Refills: 3 | Status: SHIPPED | OUTPATIENT
Start: 2024-09-03

## 2024-09-03 RX ORDER — METFORMIN HCL 500 MG
1000 TABLET, EXTENDED RELEASE 24 HR ORAL
Qty: 60 TABLET | Refills: 3 | Status: SHIPPED | OUTPATIENT
Start: 2024-09-03

## 2024-09-03 RX ORDER — AMLODIPINE BESYLATE 10 MG/1
10 TABLET ORAL DAILY
Qty: 30 TABLET | Refills: 3 | Status: SHIPPED | OUTPATIENT
Start: 2024-09-03

## 2024-09-03 RX ORDER — LOSARTAN POTASSIUM 25 MG/1
25 TABLET ORAL DAILY
Qty: 30 TABLET | Refills: 3 | Status: SHIPPED | OUTPATIENT
Start: 2024-09-03

## 2024-09-03 SDOH — ECONOMIC STABILITY: FOOD INSECURITY: WITHIN THE PAST 12 MONTHS, THE FOOD YOU BOUGHT JUST DIDN'T LAST AND YOU DIDN'T HAVE MONEY TO GET MORE.: NEVER TRUE

## 2024-09-03 SDOH — ECONOMIC STABILITY: FOOD INSECURITY: WITHIN THE PAST 12 MONTHS, YOU WORRIED THAT YOUR FOOD WOULD RUN OUT BEFORE YOU GOT MONEY TO BUY MORE.: NEVER TRUE

## 2024-09-03 SDOH — ECONOMIC STABILITY: INCOME INSECURITY: HOW HARD IS IT FOR YOU TO PAY FOR THE VERY BASICS LIKE FOOD, HOUSING, MEDICAL CARE, AND HEATING?: NOT HARD AT ALL

## 2024-09-03 ASSESSMENT — ENCOUNTER SYMPTOMS
DIARRHEA: 0
ABDOMINAL DISTENTION: 0
PHOTOPHOBIA: 0
VOMITING: 0
WHEEZING: 0
COUGH: 0
ABDOMINAL PAIN: 0
CHEST TIGHTNESS: 0
SHORTNESS OF BREATH: 0
NAUSEA: 0

## 2024-09-03 NOTE — PROGRESS NOTES
\"Have you been to the ER, urgent care clinic since your last visit?  Hospitalized since your last visit?\"    NO    “Have you seen or consulted any other health care providers outside of [facility] since your last visit?”    NO    Chief Complaint   Patient presents with    Follow-up    Hypertension    Diabetes     ,/82 (Site: Right Upper Arm, Position: Sitting, Cuff Size: Medium Adult)   Pulse (!) 104   Temp 98.2 °F (36.8 °C) (Temporal)   Resp 16   Ht 1.803 m (5' 11\")   Wt 112 kg (247 lb)   SpO2 96%   BMI 34.45 kg/m²           Click Here for Release of Records Request

## 2024-09-03 NOTE — PROGRESS NOTES
Ricardo Stephens Jr.  38 y.o. male  1985  [address]  [place] [address]  [id number]     [facility]: Progress Note       Encounter Date: 9/3/2024    Patient presents with the following chief complaint(s)    Chief Complaint   Patient presents with    Follow-up    Hypertension    Diabetes        History provided by patient    Assessment and Plan:   1. Uncontrolled type 2 diabetes mellitus with hyperglycemia (HCC)  Comments:  Continue metformin, jardiance-coupon card given. Add Januvia. Discussed importance of low-carb and low sugar diet  Orders:  -     AMB POC HEMOGLOBIN A1C  -     AMB POC GLUCOSE BLOOD, BY GLUCOSE MONITORING DEVICE  -     empagliflozin (JARDIANCE) 25 MG tablet; Take 1 tablet by mouth daily, Disp-30 tablet, R-3Normal  -     metFORMIN (GLUCOPHAGE-XR) 500 MG extended release tablet; Take 2 tablets by mouth daily (with breakfast), Disp-60 tablet, R-3Normal  -     Comprehensive Metabolic Panel; Future  -     SITagliptin (JANUVIA) 100 MG tablet; Take 1 tablet by mouth daily, Disp-30 tablet, R-3Normal  2. Primary hypertension  Comments:  Continue amlodipine 10 &  losartan 25 daily. Labs ordered.  Orders:  -     amLODIPine (NORVASC) 10 MG tablet; Take 1 tablet by mouth daily, Disp-30 tablet, R-3Normal  -     losartan (COZAAR) 25 MG tablet; Take 1 tablet by mouth daily, Disp-30 tablet, R-3Normal  -     CBC; Future  -     Comprehensive Metabolic Panel; Future  3. Hyperlipidemia LDL goal <70  Comments:  Continue statin 10 mg today. Labs ordered  Orders:  -     atorvastatin (LIPITOR) 10 MG tablet; Take 1 tablet by mouth daily, Disp-30 tablet, R-3Normal  -     Lipid Panel; Future  4. Tobacco dependence due to cigarettes  Comments:  Discussed importance of cessation  5. Bipolar disease, chronic (HCC)       Return in about 3 months (around 12/3/2024) for DM, HTN, Choles..  History of Present Illness   Ricardo Stephens Jr. is a 38 y.o. male with

## 2024-09-17 LAB
ALBUMIN SERPL-MCNC: 4.7 G/DL (ref 4.1–5.1)
ALP SERPL-CCNC: 107 IU/L (ref 44–121)
ALT SERPL-CCNC: 42 IU/L (ref 0–44)
AST SERPL-CCNC: 26 IU/L (ref 0–40)
BILIRUB SERPL-MCNC: 0.7 MG/DL (ref 0–1.2)
BUN SERPL-MCNC: 8 MG/DL (ref 6–24)
BUN/CREAT SERPL: 9 (ref 9–20)
CALCIUM SERPL-MCNC: 9.7 MG/DL (ref 8.7–10.2)
CHLORIDE SERPL-SCNC: 103 MMOL/L (ref 96–106)
CHOLEST SERPL-MCNC: 125 MG/DL (ref 100–199)
CO2 SERPL-SCNC: 24 MMOL/L (ref 20–29)
CREAT SERPL-MCNC: 0.93 MG/DL (ref 0.76–1.27)
EGFRCR SERPLBLD CKD-EPI 2021: 106 ML/MIN/1.73
ERYTHROCYTE [DISTWIDTH] IN BLOOD BY AUTOMATED COUNT: 11.9 % (ref 11.6–15.4)
GLOBULIN SER CALC-MCNC: 2.7 G/DL (ref 1.5–4.5)
GLUCOSE SERPL-MCNC: 167 MG/DL (ref 70–99)
HCT VFR BLD AUTO: 51.3 % (ref 37.5–51)
HDLC SERPL-MCNC: 62 MG/DL
HGB BLD-MCNC: 17 G/DL (ref 13–17.7)
LDLC SERPL CALC-MCNC: 51 MG/DL (ref 0–99)
MCH RBC QN AUTO: 29.9 PG (ref 26.6–33)
MCHC RBC AUTO-ENTMCNC: 33.1 G/DL (ref 31.5–35.7)
MCV RBC AUTO: 90 FL (ref 79–97)
PLATELET # BLD AUTO: 194 X10E3/UL (ref 150–450)
POTASSIUM SERPL-SCNC: 4.3 MMOL/L (ref 3.5–5.2)
PROT SERPL-MCNC: 7.4 G/DL (ref 6–8.5)
RBC # BLD AUTO: 5.68 X10E6/UL (ref 4.14–5.8)
SODIUM SERPL-SCNC: 143 MMOL/L (ref 134–144)
TRIGL SERPL-MCNC: 54 MG/DL (ref 0–149)
VLDLC SERPL CALC-MCNC: 12 MG/DL (ref 5–40)
WBC # BLD AUTO: 9.9 X10E3/UL (ref 3.4–10.8)